# Patient Record
Sex: MALE | Race: WHITE | Employment: UNEMPLOYED | URBAN - METROPOLITAN AREA
[De-identification: names, ages, dates, MRNs, and addresses within clinical notes are randomized per-mention and may not be internally consistent; named-entity substitution may affect disease eponyms.]

---

## 2017-03-13 ENCOUNTER — HOSPITAL ENCOUNTER (OUTPATIENT)
Dept: LAB | Age: 57
Discharge: HOME | End: 2017-03-13

## 2017-03-13 LAB
ANION GAP BLD CALC-SCNC: 10 MMOL/L (ref 3–18)
BASOPHILS # BLD AUTO: 0.1 K/UL (ref 0–0.06)
BASOPHILS # BLD: 1 % (ref 0–2)
BUN SERPL-MCNC: 13 MG/DL (ref 7–18)
BUN/CREAT SERPL: 19 (ref 12–20)
CALCIUM SERPL-MCNC: 8.3 MG/DL (ref 8.5–10.1)
CHLORIDE SERPL-SCNC: 105 MMOL/L (ref 100–108)
CO2 SERPL-SCNC: 26 MMOL/L (ref 21–32)
CREAT SERPL-MCNC: 0.7 MG/DL (ref 0.6–1.3)
CRP SERPL-MCNC: <0.3 MG/DL (ref 0–0.3)
DIFFERENTIAL METHOD BLD: ABNORMAL
EOSINOPHIL # BLD: 0.3 K/UL (ref 0–0.4)
EOSINOPHIL NFR BLD: 4 % (ref 0–5)
ERYTHROCYTE [DISTWIDTH] IN BLOOD BY AUTOMATED COUNT: 14.3 % (ref 11.6–14.5)
ERYTHROCYTE [SEDIMENTATION RATE] IN BLOOD: 45 MM/HR (ref 0–20)
GLUCOSE SERPL-MCNC: 120 MG/DL (ref 74–99)
HCT VFR BLD AUTO: 35.5 % (ref 36–48)
HGB BLD-MCNC: 11.2 G/DL (ref 13–16)
LYMPHOCYTES # BLD AUTO: 19 % (ref 21–52)
LYMPHOCYTES # BLD: 1.4 K/UL (ref 0.9–3.6)
MCH RBC QN AUTO: 27.5 PG (ref 24–34)
MCHC RBC AUTO-ENTMCNC: 31.5 G/DL (ref 31–37)
MCV RBC AUTO: 87.2 FL (ref 74–97)
MONOCYTES # BLD: 0.8 K/UL (ref 0.05–1.2)
MONOCYTES NFR BLD AUTO: 10 % (ref 3–10)
NEUTS SEG # BLD: 4.8 K/UL (ref 1.8–8)
NEUTS SEG NFR BLD AUTO: 66 % (ref 40–73)
PLATELET # BLD AUTO: 268 K/UL (ref 135–420)
PMV BLD AUTO: 11 FL (ref 9.2–11.8)
POTASSIUM SERPL-SCNC: 4.4 MMOL/L (ref 3.5–5.5)
RBC # BLD AUTO: 4.07 M/UL (ref 4.7–5.5)
SODIUM SERPL-SCNC: 141 MMOL/L (ref 136–145)
WBC # BLD AUTO: 7.3 K/UL (ref 4.6–13.2)

## 2017-03-13 PROCEDURE — 86140 C-REACTIVE PROTEIN: CPT | Performed by: INTERNAL MEDICINE

## 2017-03-13 PROCEDURE — 80048 BASIC METABOLIC PNL TOTAL CA: CPT | Performed by: INTERNAL MEDICINE

## 2017-03-13 PROCEDURE — 85025 COMPLETE CBC W/AUTO DIFF WBC: CPT | Performed by: INTERNAL MEDICINE

## 2017-03-13 PROCEDURE — 85652 RBC SED RATE AUTOMATED: CPT | Performed by: INTERNAL MEDICINE

## 2017-03-18 ENCOUNTER — HOSPITAL ENCOUNTER (OUTPATIENT)
Dept: INFUSION THERAPY | Age: 57
Discharge: HOME | End: 2017-03-18
Payer: MEDICARE

## 2017-03-18 VITALS
HEART RATE: 88 BPM | BODY MASS INDEX: 26.87 KG/M2 | OXYGEN SATURATION: 99 % | DIASTOLIC BLOOD PRESSURE: 86 MMHG | WEIGHT: 177.3 LBS | RESPIRATION RATE: 18 BRPM | SYSTOLIC BLOOD PRESSURE: 130 MMHG | HEIGHT: 68 IN | TEMPERATURE: 98.1 F

## 2017-03-18 PROCEDURE — 74011250636 HC RX REV CODE- 250/636: Performed by: PHYSICIAN ASSISTANT

## 2017-03-18 PROCEDURE — 74011250636 HC RX REV CODE- 250/636: Performed by: INTERNAL MEDICINE

## 2017-03-18 PROCEDURE — 96365 THER/PROPH/DIAG IV INF INIT: CPT

## 2017-03-18 PROCEDURE — 74011000258 HC RX REV CODE- 258: Performed by: PHYSICIAN ASSISTANT

## 2017-03-18 PROCEDURE — 77030020847 HC STATLOK BARD -A

## 2017-03-18 RX ORDER — HEPARIN 100 UNIT/ML
500 SYRINGE INTRAVENOUS AS NEEDED
Status: DISCONTINUED | OUTPATIENT
Start: 2017-03-18 | End: 2017-03-22 | Stop reason: HOSPADM

## 2017-03-18 RX ORDER — SODIUM CHLORIDE 0.9 % (FLUSH) 0.9 %
10-40 SYRINGE (ML) INJECTION AS NEEDED
Status: DISCONTINUED | OUTPATIENT
Start: 2017-03-18 | End: 2017-03-22 | Stop reason: HOSPADM

## 2017-03-18 RX ORDER — HYDROMORPHONE HYDROCHLORIDE 4 MG/1
4 TABLET ORAL
COMMUNITY
End: 2017-04-06

## 2017-03-18 RX ORDER — CELECOXIB 100 MG/1
100 CAPSULE ORAL 2 TIMES DAILY
COMMUNITY

## 2017-03-18 RX ADMIN — CEFTRIAXONE SODIUM 2 G: 2 INJECTION, POWDER, FOR SOLUTION INTRAMUSCULAR; INTRAVENOUS at 11:20

## 2017-03-18 RX ADMIN — Medication 20 ML: at 11:55

## 2017-03-18 RX ADMIN — Medication 500 UNITS: at 11:56

## 2017-03-18 RX ADMIN — Medication 20 ML: at 11:20

## 2017-03-18 NOTE — PROGRESS NOTES
[facility] Progress Note    Date: 2017    Name: Denisse Wilcox    MRN: [id number]         : 1960    Mr. Kacy Orosco was assessed and education was provided. Patient's first visit ot [facility]. States that he was at [address] [place] [address] for rehab and was discharged yesterday. Staples noted along lumbar spine intact, skin slightly pink. No drainage noted, slight swelling at top staple. Mr. Jeff Ayon vitals were reviewed. Visit Vitals    /86 (BP 1 Location: Left arm, BP Patient Position: Sitting)    Pulse 88    Temp 98.1 °F (36.7 °C)    Resp 18    Ht 5' 8\" (1.727 m)    Wt 80.4 kg (177 lb 4.8 oz)    SpO2 99%    BMI 26.96 kg/m2     Good blood return obtained from each lumen of PICC line at right upper arm. Each lumen flushed with 10 ml NS. Dressing changed at PICC site. No irritation, redness, ecchymosis, or drainage noted. New Stat Uriel, Bio Patch and Tegaderm dressing applied. []  Vancomycin     []  Invanz     []  Cubicin     [x]  Rocephin 2 grams    was infused at 100 ml/hr. No s/s reaction noted. New Clave end applied with flush of 10 ml per lumen, followed by 2.5 ml of 100 units/ml Heparin per lumen. Lumens then wrapped and secured. Mr. Kacy Orosco tolerated infusion, and had no complaints at this time. Patient armband removed and shredded. Mr. Kacy Orosco was discharged from [place] [address] in stable condition at 1205. He is to return on 3/19/2017 at 1100 for his next appointment for abx.     Brynn Monte RN  2017  12:08 PM

## 2017-03-19 ENCOUNTER — HOSPITAL ENCOUNTER (OUTPATIENT)
Dept: INFUSION THERAPY | Age: 57
Discharge: HOME | End: 2017-03-19
Payer: MEDICARE

## 2017-03-19 VITALS
DIASTOLIC BLOOD PRESSURE: 87 MMHG | SYSTOLIC BLOOD PRESSURE: 126 MMHG | TEMPERATURE: 98.8 F | RESPIRATION RATE: 18 BRPM | HEART RATE: 92 BPM

## 2017-03-19 PROCEDURE — 74011000258 HC RX REV CODE- 258: Performed by: PHYSICIAN ASSISTANT

## 2017-03-19 PROCEDURE — 74011250636 HC RX REV CODE- 250/636: Performed by: PHYSICIAN ASSISTANT

## 2017-03-19 PROCEDURE — 96365 THER/PROPH/DIAG IV INF INIT: CPT

## 2017-03-19 RX ORDER — HEPARIN 100 UNIT/ML
500 SYRINGE INTRAVENOUS AS NEEDED
Status: DISCONTINUED | OUTPATIENT
Start: 2017-03-19 | End: 2017-03-23 | Stop reason: HOSPADM

## 2017-03-19 RX ORDER — SODIUM CHLORIDE 0.9 % (FLUSH) 0.9 %
10-40 SYRINGE (ML) INJECTION AS NEEDED
Status: DISCONTINUED | OUTPATIENT
Start: 2017-03-19 | End: 2017-03-23 | Stop reason: HOSPADM

## 2017-03-19 RX ADMIN — Medication 40 ML: at 12:15

## 2017-03-19 RX ADMIN — Medication 500 UNITS: at 12:15

## 2017-03-19 RX ADMIN — Medication 40 ML: at 11:30

## 2017-03-19 RX ADMIN — CEFTRIAXONE SODIUM 2 G: 2 INJECTION, POWDER, FOR SOLUTION INTRAMUSCULAR; INTRAVENOUS at 11:34

## 2017-03-20 ENCOUNTER — HOSPITAL ENCOUNTER (OUTPATIENT)
Dept: INFUSION THERAPY | Age: 57
Discharge: HOME | End: 2017-03-20
Payer: MEDICARE

## 2017-03-20 VITALS
OXYGEN SATURATION: 99 % | HEART RATE: 93 BPM | DIASTOLIC BLOOD PRESSURE: 82 MMHG | RESPIRATION RATE: 18 BRPM | TEMPERATURE: 97.8 F | SYSTOLIC BLOOD PRESSURE: 143 MMHG

## 2017-03-20 LAB
ANION GAP BLD CALC-SCNC: 13 MMOL/L (ref 3–18)
BASOPHILS # BLD AUTO: 0.1 K/UL (ref 0–0.06)
BASOPHILS # BLD: 1 % (ref 0–2)
BUN SERPL-MCNC: 21 MG/DL (ref 7–18)
BUN/CREAT SERPL: 31 (ref 12–20)
CALCIUM SERPL-MCNC: 8.8 MG/DL (ref 8.5–10.1)
CHLORIDE SERPL-SCNC: 106 MMOL/L (ref 100–108)
CO2 SERPL-SCNC: 25 MMOL/L (ref 21–32)
CREAT SERPL-MCNC: 0.68 MG/DL (ref 0.6–1.3)
CRP SERPL-MCNC: 2.9 MG/DL (ref 0–0.3)
DIFFERENTIAL METHOD BLD: ABNORMAL
EOSINOPHIL # BLD: 0.3 K/UL (ref 0–0.4)
EOSINOPHIL NFR BLD: 4 % (ref 0–5)
ERYTHROCYTE [DISTWIDTH] IN BLOOD BY AUTOMATED COUNT: 13.8 % (ref 11.6–14.5)
ERYTHROCYTE [SEDIMENTATION RATE] IN BLOOD: 18 MM/HR (ref 0–20)
GLUCOSE SERPL-MCNC: 113 MG/DL (ref 74–99)
HCT VFR BLD AUTO: 39.6 % (ref 36–48)
HGB BLD-MCNC: 12.4 G/DL (ref 13–16)
LYMPHOCYTES # BLD AUTO: 23 % (ref 21–52)
LYMPHOCYTES # BLD: 2 K/UL (ref 0.9–3.6)
MCH RBC QN AUTO: 27.7 PG (ref 24–34)
MCHC RBC AUTO-ENTMCNC: 31.3 G/DL (ref 31–37)
MCV RBC AUTO: 88.6 FL (ref 74–97)
MONOCYTES # BLD: 0.9 K/UL (ref 0.05–1.2)
MONOCYTES NFR BLD AUTO: 10 % (ref 3–10)
NEUTS SEG # BLD: 5.5 K/UL (ref 1.8–8)
NEUTS SEG NFR BLD AUTO: 62 % (ref 40–73)
PLATELET # BLD AUTO: 245 K/UL (ref 135–420)
PMV BLD AUTO: 11.1 FL (ref 9.2–11.8)
POTASSIUM SERPL-SCNC: 4 MMOL/L (ref 3.5–5.5)
RBC # BLD AUTO: 4.47 M/UL (ref 4.7–5.5)
SODIUM SERPL-SCNC: 144 MMOL/L (ref 136–145)
WBC # BLD AUTO: 8.8 K/UL (ref 4.6–13.2)

## 2017-03-20 PROCEDURE — 85652 RBC SED RATE AUTOMATED: CPT | Performed by: INTERNAL MEDICINE

## 2017-03-20 PROCEDURE — 86140 C-REACTIVE PROTEIN: CPT | Performed by: INTERNAL MEDICINE

## 2017-03-20 PROCEDURE — 74011250636 HC RX REV CODE- 250/636: Performed by: PHYSICIAN ASSISTANT

## 2017-03-20 PROCEDURE — 96365 THER/PROPH/DIAG IV INF INIT: CPT

## 2017-03-20 PROCEDURE — 74011000258 HC RX REV CODE- 258: Performed by: PHYSICIAN ASSISTANT

## 2017-03-20 PROCEDURE — 74011250636 HC RX REV CODE- 250/636: Performed by: INTERNAL MEDICINE

## 2017-03-20 PROCEDURE — 85025 COMPLETE CBC W/AUTO DIFF WBC: CPT | Performed by: INTERNAL MEDICINE

## 2017-03-20 PROCEDURE — 80048 BASIC METABOLIC PNL TOTAL CA: CPT | Performed by: INTERNAL MEDICINE

## 2017-03-20 RX ORDER — HEPARIN 100 UNIT/ML
500 SYRINGE INTRAVENOUS AS NEEDED
Status: DISPENSED | OUTPATIENT
Start: 2017-03-20 | End: 2017-03-21

## 2017-03-20 RX ORDER — SODIUM CHLORIDE 0.9 % (FLUSH) 0.9 %
10 SYRINGE (ML) INJECTION AS NEEDED
Status: DISCONTINUED | OUTPATIENT
Start: 2017-03-20 | End: 2017-03-24 | Stop reason: HOSPADM

## 2017-03-20 RX ADMIN — HEPARIN 500 UNITS: 100 SYRINGE at 15:52

## 2017-03-20 RX ADMIN — CEFTRIAXONE SODIUM 2 G: 2 INJECTION, POWDER, FOR SOLUTION INTRAMUSCULAR; INTRAVENOUS at 15:11

## 2017-03-20 RX ADMIN — Medication 10 ML: at 15:11

## 2017-03-20 RX ADMIN — Medication 10 ML: at 15:42

## 2017-03-20 NOTE — PROGRESS NOTES
[facility] Progress Note    Date: 2017    Name: Dejah Blood    MRN: [id number]         : 1960      Mr. Gertrudis Quinonez arrived to [facility] at [phone]. Mr. Gertrudis Quinonez was assessed and education was provided. Mr. Yohana Mercer vitals were reviewed. Visit Vitals    /82 (BP 1 Location: Left arm, BP Patient Position: Sitting)    Pulse 93    Temp 97.8 °F (36.6 °C)    Resp 18    SpO2 99%       Pt with Right upper arm double lumen PICC line. Each lumen flushes without difficulty and positive for blood return. Dressing CDI. No redness, bruising, or swelling noted at site and/ or extremity. Pt denies tenderness. Blood drawn for labs. Labs obtained and reviewed. Recent Results (from the past 12 hour(s))   CBC WITH AUTOMATED DIFF    Collection Time: 17  3:00 PM   Result Value Ref Range    WBC 8.8 4.6 - 13.2 K/uL    RBC 4.47 (L) 4.70 - 5.50 M/uL    HGB 12.4 (L) 13.0 - 16.0 g/dL    HCT 39.6 36.0 - 48.0 %    MCV 88.6 74.0 - 97.0 FL    MCH 27.7 24.0 - 34.0 PG    MCHC 31.3 31.0 - 37.0 g/dL    RDW 13.8 11.6 - 14.5 %    PLATELET 661 053 - 777 K/uL    MPV 11.1 9.2 - 11.8 FL    NEUTROPHILS 62 40 - 73 %    LYMPHOCYTES 23 21 - 52 %    MONOCYTES 10 3 - 10 %    EOSINOPHILS 4 0 - 5 %    BASOPHILS 1 0 - 2 %    ABS. NEUTROPHILS 5.5 1.8 - 8.0 K/UL    ABS. LYMPHOCYTES 2.0 0.9 - 3.6 K/UL    ABS. MONOCYTES 0.9 0.05 - 1.2 K/UL    ABS. EOSINOPHILS 0.3 0.0 - 0.4 K/UL    ABS.  BASOPHILS 0.1 (H) 0.0 - 0.06 K/UL    DF AUTOMATED     METABOLIC PANEL, BASIC    Collection Time: 17  3:00 PM   Result Value Ref Range    Sodium 144 136 - 145 mmol/L    Potassium 4.0 3.5 - 5.5 mmol/L    Chloride 106 100 - 108 mmol/L    CO2 25 21 - 32 mmol/L    Anion gap 13 3.0 - 18 mmol/L    Glucose 113 (H) 74 - 99 mg/dL    BUN 21 (H) 7.0 - 18 MG/DL    Creatinine 0.68 0.6 - 1.3 MG/DL    BUN/Creatinine ratio 31 (H) 12 - 20      GFR est AA >60 >60 ml/min/1.73m2    GFR est non-AA >60 >60 ml/min/1.73m2    Calcium 8.8 8.5 - 10.1 MG/DL   SED RATE (ESR)    Collection Time: 03/20/17  3:00 PM   Result Value Ref Range    Sed rate, automated 18 0 - 20 mm/hr       Rocephin 2 g was administered as ordered via purple  lumen followed by normal saline flush. Mr. Natalie Brownlee tolerated well without complaints. Flushed each lumen of pt's PICC line with heparin per order. Lumens wrapped in gauze and paper tape. Mr. Natalie Brownlee was discharged from [place] [address] in stable condition at [phone]. He is to return on March 21, 2017 at 1300 for his next appointment.     Sole Alvarez RN  March 20, 2017

## 2017-03-21 ENCOUNTER — HOSPITAL ENCOUNTER (OUTPATIENT)
Dept: INFUSION THERAPY | Age: 57
Discharge: HOME | End: 2017-03-21
Payer: MEDICARE

## 2017-03-21 VITALS
SYSTOLIC BLOOD PRESSURE: 147 MMHG | DIASTOLIC BLOOD PRESSURE: 88 MMHG | OXYGEN SATURATION: 98 % | RESPIRATION RATE: 18 BRPM | TEMPERATURE: 98.4 F | HEART RATE: 86 BPM

## 2017-03-21 PROCEDURE — 74011250636 HC RX REV CODE- 250/636: Performed by: INTERNAL MEDICINE

## 2017-03-21 PROCEDURE — 96365 THER/PROPH/DIAG IV INF INIT: CPT

## 2017-03-21 PROCEDURE — 74011250636 HC RX REV CODE- 250/636: Performed by: PHYSICIAN ASSISTANT

## 2017-03-21 PROCEDURE — 74011000258 HC RX REV CODE- 258: Performed by: PHYSICIAN ASSISTANT

## 2017-03-21 RX ORDER — HEPARIN 100 UNIT/ML
500 SYRINGE INTRAVENOUS ONCE
Status: COMPLETED | OUTPATIENT
Start: 2017-03-21 | End: 2017-03-21

## 2017-03-21 RX ORDER — SODIUM CHLORIDE 0.9 % (FLUSH) 0.9 %
10-40 SYRINGE (ML) INJECTION AS NEEDED
Status: DISCONTINUED | OUTPATIENT
Start: 2017-03-21 | End: 2017-03-25 | Stop reason: HOSPADM

## 2017-03-21 RX ADMIN — HEPARIN 500 UNITS: 100 SYRINGE at 14:10

## 2017-03-21 RX ADMIN — CEFTRIAXONE SODIUM 2 G: 2 INJECTION, POWDER, FOR SOLUTION INTRAMUSCULAR; INTRAVENOUS at 13:34

## 2017-03-21 RX ADMIN — Medication 10 ML: at 14:10

## 2017-03-21 RX ADMIN — Medication 20 ML: at 13:33

## 2017-03-21 NOTE — PROGRESS NOTES
[facility] Progress Note    Date: 2017    Name: Niru Weiner    MRN: [id number]         : 1960    Rocephin Infusion    Mr. Antonio Pink to [facility], ambulatory using a cane at 1320. Pt was assessed and education was provided. Pt's feet are swollen with pitting edema: right with 1-2+ foot/ankle and left with 2-3+ foot/ankle/calf. Pt endorses left calf pain. Both feet with 2+ DP and PT pulses and normal temperature. Toes on both feet are pink. Pt denies h/o DVT. Pt reports his feet were swollen when in the hospital and rehab because he was \"mostly lying on his back. \" He states the swelling is worse today but that he thinks that is because he has \"been on his feet. \" He reports he is going to see his neurosurgeon on Thursday and will discuss the swelling with him at that time. Pt instructed to keep his feet elevated as much as possible and to call 911 for SOB, cough with pink or frothy sputum, or chest pain. Instructed him to call MD right away if pain or swelling get worse. Mr. Carol Ann Canada vitals were reviewed. Visit Vitals    /88 (BP 1 Location: Left arm, BP Patient Position: Sitting)    Pulse 86    Temp 98.4 °F (36.9 °C)    Resp 18    SpO2 98%       Right upper arm PICC flushed easily and had brisk blood return via red port and blood return from purple lumen with vigorous flushing. PICC dressing c/d/i and not due to be changed today. No swelling, redness, streaking, warmth or drainage noted in arm. Pt denied c/o pain in arm.      []  Vancomycin     []  Invanz     []  Cubicin     [x]  Rocephin 2 gm    was infused at 100 ml/hr (over approximately 30 minutes). Mr. Antonio Pink tolerated infusion, and had no complaints at this time. Both lumens of PICC flushed with NS 10 ml and Heparin 250 units. Lumens wrapped with guaze and paper tape. Stockinette placed over dressing for protection. Patient armband removed and shredded.     Mr. Antonio Pink was discharged from [place] [address] in stable condition at 1415. He is to return on 3/22/17 at 1500 for his next antibiotic appointment.     Tootie Gambino RN  March 21, 2017

## 2017-03-22 ENCOUNTER — HOSPITAL ENCOUNTER (OUTPATIENT)
Dept: INFUSION THERAPY | Age: 57
Discharge: HOME | End: 2017-03-22
Payer: MEDICARE

## 2017-03-22 VITALS
OXYGEN SATURATION: 98 % | SYSTOLIC BLOOD PRESSURE: 142 MMHG | HEART RATE: 93 BPM | RESPIRATION RATE: 18 BRPM | TEMPERATURE: 97.7 F | DIASTOLIC BLOOD PRESSURE: 78 MMHG

## 2017-03-22 PROCEDURE — 74011250636 HC RX REV CODE- 250/636: Performed by: PHYSICIAN ASSISTANT

## 2017-03-22 PROCEDURE — 74011250636 HC RX REV CODE- 250/636: Performed by: INTERNAL MEDICINE

## 2017-03-22 PROCEDURE — 74011000258 HC RX REV CODE- 258: Performed by: PHYSICIAN ASSISTANT

## 2017-03-22 PROCEDURE — 96365 THER/PROPH/DIAG IV INF INIT: CPT

## 2017-03-22 RX ORDER — HEPARIN 100 UNIT/ML
500 SYRINGE INTRAVENOUS ONCE
Status: COMPLETED | OUTPATIENT
Start: 2017-03-22 | End: 2017-03-22

## 2017-03-22 RX ORDER — SODIUM CHLORIDE 0.9 % (FLUSH) 0.9 %
10-40 SYRINGE (ML) INJECTION AS NEEDED
Status: DISCONTINUED | OUTPATIENT
Start: 2017-03-22 | End: 2017-03-26 | Stop reason: HOSPADM

## 2017-03-22 RX ADMIN — HEPARIN 500 UNITS: 100 SYRINGE at 15:56

## 2017-03-22 RX ADMIN — CEFTRIAXONE SODIUM 2 G: 2 INJECTION, POWDER, FOR SOLUTION INTRAMUSCULAR; INTRAVENOUS at 15:16

## 2017-03-22 RX ADMIN — Medication 10 ML: at 15:55

## 2017-03-22 NOTE — PROGRESS NOTES
[facility] Progress Note    Date: 2017    Name: Yo Perez    MRN: [id number]         : 1960    Rocephin Infusion    Mr. Alonzo Navarrete to [facility], ambulatory at 1510. Pt was assessed and education was provided. Mr. Parul Iniguez vitals were reviewed. Visit Vitals    /78 (BP 1 Location: Left arm, BP Patient Position: Sitting)    Pulse 93    Temp 97.7 °F (36.5 °C)    Resp 18    SpO2 98%       Right upper arm PICC flushed easily and had brisk blood return via both ports. PICC dressing c/d/i and not due to be changed. No swelling, redness, streaking, warmth or drainage noted in arm. Pt denied c/o pain in arm.      []  Vancomycin     []  Invanz     []  Cubicin     [x]  Rocephin 2 grams    was infused at 100 ml/hr (over approximately 30 minutes). Mr. Alonzo Navarrete tolerated infusion, and had no complaints at this time. Both lumens of PICC flushed with NS 10 ml and Heparin 250 units. Lumens wrapped with guaze and paper tape. Stockinette placed over dressing for protection. Patient armband removed and shredded. Mr. Alonzo Navarrete was discharged from [place] [address] in stable condition a 1600t . He is to return on 3/23/17 at 1300 for his next antibiotic appointment.     Shaun Francisco RN  2017

## 2017-03-23 ENCOUNTER — HOSPITAL ENCOUNTER (OUTPATIENT)
Dept: INFUSION THERAPY | Age: 57
Discharge: HOME | End: 2017-03-23
Payer: MEDICARE

## 2017-03-23 VITALS
RESPIRATION RATE: 18 BRPM | TEMPERATURE: 98 F | DIASTOLIC BLOOD PRESSURE: 92 MMHG | SYSTOLIC BLOOD PRESSURE: 156 MMHG | OXYGEN SATURATION: 97 % | HEART RATE: 77 BPM

## 2017-03-23 PROCEDURE — 74011000258 HC RX REV CODE- 258: Performed by: PHYSICIAN ASSISTANT

## 2017-03-23 PROCEDURE — 96365 THER/PROPH/DIAG IV INF INIT: CPT

## 2017-03-23 PROCEDURE — 74011250636 HC RX REV CODE- 250/636: Performed by: PHYSICIAN ASSISTANT

## 2017-03-23 PROCEDURE — 74011250636 HC RX REV CODE- 250/636: Performed by: INTERNAL MEDICINE

## 2017-03-23 RX ORDER — SODIUM CHLORIDE 0.9 % (FLUSH) 0.9 %
5-10 SYRINGE (ML) INJECTION AS NEEDED
Status: DISCONTINUED | OUTPATIENT
Start: 2017-03-23 | End: 2017-03-27 | Stop reason: HOSPADM

## 2017-03-23 RX ORDER — HEPARIN 100 UNIT/ML
500 SYRINGE INTRAVENOUS ONCE
Status: COMPLETED | OUTPATIENT
Start: 2017-03-23 | End: 2017-03-23

## 2017-03-23 RX ADMIN — Medication 500 UNITS: at 13:40

## 2017-03-23 RX ADMIN — Medication 10 ML: at 13:40

## 2017-03-23 RX ADMIN — CEFTRIAXONE SODIUM 2 G: 2 INJECTION, POWDER, FOR SOLUTION INTRAMUSCULAR; INTRAVENOUS at 13:08

## 2017-03-23 NOTE — PROGRESS NOTES
[facility] Progress Note    Date: 2017    Name: Sadie Nielsen    MRN: [id number]         : 1960    Mr. Bunny Duverney was assessed and education was provided. Mr. Trung Johnson vitals were reviewed. Visit Vitals    BP (!) 156/92 (BP 1 Location: Left arm, BP Patient Position: Sitting)    Pulse 77    Temp 98 °F (36.7 °C)    Resp 18    SpO2 97%       Lab results were obtained and reviewed. No results found for this or any previous visit (from the past 12 hour(s)). []  Vancomycin     []  Invanz     []  Cubicin     [x]  Rocephin 2 grams IV    was infused at  100 ml/hr. Mr. Bunny Duverney tolerated infusion, and had no complaints at this time. Patient armband removed and shredded. Mr. Bunny Duverney was discharged from [place] [address] in stable condition at 1350. He is to return on 3/24/17 for his next appointment.     Fanta Adan RN  2017  1:20 PM

## 2017-03-24 ENCOUNTER — HOSPITAL ENCOUNTER (OUTPATIENT)
Dept: INFUSION THERAPY | Age: 57
Discharge: HOME | End: 2017-03-24
Payer: MEDICARE

## 2017-03-24 VITALS
OXYGEN SATURATION: 98 % | SYSTOLIC BLOOD PRESSURE: 143 MMHG | TEMPERATURE: 98.6 F | HEART RATE: 82 BPM | RESPIRATION RATE: 18 BRPM | DIASTOLIC BLOOD PRESSURE: 83 MMHG

## 2017-03-24 PROCEDURE — 96365 THER/PROPH/DIAG IV INF INIT: CPT

## 2017-03-24 PROCEDURE — 74011250636 HC RX REV CODE- 250/636: Performed by: PHYSICIAN ASSISTANT

## 2017-03-24 PROCEDURE — 74011000258 HC RX REV CODE- 258: Performed by: PHYSICIAN ASSISTANT

## 2017-03-24 PROCEDURE — 74011250636 HC RX REV CODE- 250/636: Performed by: INTERNAL MEDICINE

## 2017-03-24 RX ORDER — SODIUM CHLORIDE 0.9 % (FLUSH) 0.9 %
10-40 SYRINGE (ML) INJECTION AS NEEDED
Status: DISCONTINUED | OUTPATIENT
Start: 2017-03-24 | End: 2017-03-28 | Stop reason: HOSPADM

## 2017-03-24 RX ORDER — HEPARIN 100 UNIT/ML
500 SYRINGE INTRAVENOUS AS NEEDED
Status: DISCONTINUED | OUTPATIENT
Start: 2017-03-24 | End: 2017-03-28 | Stop reason: HOSPADM

## 2017-03-24 RX ADMIN — Medication 20 ML: at 14:29

## 2017-03-24 RX ADMIN — Medication 500 UNITS: at 15:00

## 2017-03-24 RX ADMIN — CEFTRIAXONE SODIUM 2 G: 2 INJECTION, POWDER, FOR SOLUTION INTRAMUSCULAR; INTRAVENOUS at 14:29

## 2017-03-24 RX ADMIN — Medication 10 ML: at 14:59

## 2017-03-25 ENCOUNTER — HOSPITAL ENCOUNTER (OUTPATIENT)
Dept: INFUSION THERAPY | Age: 57
Discharge: HOME | End: 2017-03-25
Payer: MEDICARE

## 2017-03-25 VITALS
RESPIRATION RATE: 18 BRPM | HEART RATE: 79 BPM | SYSTOLIC BLOOD PRESSURE: 150 MMHG | DIASTOLIC BLOOD PRESSURE: 87 MMHG | OXYGEN SATURATION: 99 % | TEMPERATURE: 98.1 F

## 2017-03-25 PROCEDURE — 74011000258 HC RX REV CODE- 258: Performed by: PHYSICIAN ASSISTANT

## 2017-03-25 PROCEDURE — 74011250636 HC RX REV CODE- 250/636: Performed by: PHYSICIAN ASSISTANT

## 2017-03-25 PROCEDURE — 96365 THER/PROPH/DIAG IV INF INIT: CPT

## 2017-03-25 PROCEDURE — 77030020847 HC STATLOK BARD -A

## 2017-03-25 PROCEDURE — 74011250636 HC RX REV CODE- 250/636: Performed by: INTERNAL MEDICINE

## 2017-03-25 RX ORDER — HEPARIN 100 UNIT/ML
500 SYRINGE INTRAVENOUS ONCE
Status: COMPLETED | OUTPATIENT
Start: 2017-03-25 | End: 2017-03-25

## 2017-03-25 RX ORDER — SODIUM CHLORIDE 0.9 % (FLUSH) 0.9 %
10-40 SYRINGE (ML) INJECTION AS NEEDED
Status: DISCONTINUED | OUTPATIENT
Start: 2017-03-25 | End: 2017-03-29 | Stop reason: HOSPADM

## 2017-03-25 RX ADMIN — Medication 20 ML: at 10:21

## 2017-03-25 RX ADMIN — HEPARIN 500 UNITS: 100 SYRINGE at 11:08

## 2017-03-25 RX ADMIN — Medication 20 ML: at 11:07

## 2017-03-25 RX ADMIN — CEFTRIAXONE SODIUM 2 G: 2 INJECTION, POWDER, FOR SOLUTION INTRAMUSCULAR; INTRAVENOUS at 10:35

## 2017-03-25 NOTE — PROGRESS NOTES
[facility] Progress Note    Date: 2017    Name: Bere Saldivar    MRN: [id number]         : 1960      Mr. Cee Hightower arrived to [facility] at [address] Patient reports feeling nausea this AM but did not vomit. Patient states he doesn't know why he feels nauseous. Patient offered some ginger ale and ice. Moments later reports feeling much better. Mr. Cee Hightower was assessed and education was provided. Mr. Lizett Ramsey vitals were reviewed. Visit Vitals    /87 (BP 1 Location: Left arm, BP Patient Position: Sitting)    Pulse 79    Temp 98.1 °F (36.7 °C)    Resp 18    SpO2 99%       Pt with right upper arm double lumen PICC line dressing change done today. Stat nile and end caps changed. Each lumen flushes without difficulty and positive for blood return. No redness, bruising, or swelling noted at site and/ or extremity. Pt denies tenderness. Ceftriaxone 2gm was administered as ordered via purple lumen followed by normal saline flush. Mr. Cee Hightower tolerated well without complaints. Flushed each lumen of pt's PICC line with heparin per order. Wrapped lumens with gauze & paper tape. Mr. Cee Hightower was discharged from [place] [address] in stable condition at 1110. He is to return on 3/26/17 at 1000 for his next appointment.     Brie Mclaughlin  2017

## 2017-03-26 ENCOUNTER — HOSPITAL ENCOUNTER (OUTPATIENT)
Dept: INFUSION THERAPY | Age: 57
Discharge: HOME | End: 2017-03-26
Payer: MEDICARE

## 2017-03-26 VITALS
SYSTOLIC BLOOD PRESSURE: 141 MMHG | DIASTOLIC BLOOD PRESSURE: 77 MMHG | HEART RATE: 77 BPM | OXYGEN SATURATION: 95 % | RESPIRATION RATE: 18 BRPM | TEMPERATURE: 97.9 F

## 2017-03-26 PROCEDURE — 74011250636 HC RX REV CODE- 250/636: Performed by: PHYSICIAN ASSISTANT

## 2017-03-26 PROCEDURE — 74011000258 HC RX REV CODE- 258: Performed by: PHYSICIAN ASSISTANT

## 2017-03-26 PROCEDURE — 96365 THER/PROPH/DIAG IV INF INIT: CPT

## 2017-03-26 RX ORDER — SODIUM CHLORIDE 0.9 % (FLUSH) 0.9 %
10 SYRINGE (ML) INJECTION AS NEEDED
Status: DISCONTINUED | OUTPATIENT
Start: 2017-03-26 | End: 2017-03-30 | Stop reason: HOSPADM

## 2017-03-26 RX ORDER — HEPARIN 100 UNIT/ML
500 SYRINGE INTRAVENOUS ONCE
Status: COMPLETED | OUTPATIENT
Start: 2017-03-26 | End: 2017-03-26

## 2017-03-26 RX ADMIN — Medication 10 ML: at 10:44

## 2017-03-26 RX ADMIN — Medication 10 ML: at 10:05

## 2017-03-26 RX ADMIN — HEPARIN 500 UNITS: 100 SYRINGE at 10:44

## 2017-03-26 RX ADMIN — CEFTRIAXONE SODIUM 2 G: 2 INJECTION, POWDER, FOR SOLUTION INTRAMUSCULAR; INTRAVENOUS at 10:07

## 2017-03-26 NOTE — PROGRESS NOTES
[facility] Progress Note    Date: 2017    Name: Sadie Nielsen    MRN: [id number]         : 1960      Mr. Bunny Duverney arrived to [facility] at 1000    Mr. Bunny Duverney was assessed and education was provided. Mr. Trung Johnson vitals were reviewed. Visit Vitals    /77 (BP 1 Location: Left arm, BP Patient Position: Sitting)    Pulse 77    Temp 97.9 °F (36.6 °C)    Resp 18    SpO2 95%       Pt with right upper arm double lumen PICC line. Each lumen flushes without difficulty and positive for blood return. Dressing CDI. No redness, bruising, or swelling noted at site and/ or extremity. Pt denies tenderness. Ceftriaxone 2gm in 50 cc normal saline was administered as ordered over 30 minutes via purple lumen followed by 10 cc normal saline flush and instilled 2.5 cc Heparin 100 units, in bilateral lumens    Mr. Bunny Duverney tolerated well without complaints. Wrapped lumens with 4x4, paper tape and secured with a stockinette. Mr. Bunny Duverney was discharged from [place] [address] in stable condition at 1045. He is to return on 3/27/17 at 36 for his next appointment for antibiotics and labs.     Caterina Barry RN  2017

## 2017-03-27 ENCOUNTER — HOSPITAL ENCOUNTER (OUTPATIENT)
Dept: INFUSION THERAPY | Age: 57
Discharge: HOME | End: 2017-03-27
Payer: MEDICARE

## 2017-03-27 VITALS
TEMPERATURE: 97.8 F | SYSTOLIC BLOOD PRESSURE: 158 MMHG | DIASTOLIC BLOOD PRESSURE: 104 MMHG | OXYGEN SATURATION: 97 % | RESPIRATION RATE: 18 BRPM | HEART RATE: 82 BPM

## 2017-03-27 LAB
ANION GAP BLD CALC-SCNC: 10 MMOL/L (ref 3–18)
BASOPHILS # BLD AUTO: 0.1 K/UL (ref 0–0.06)
BASOPHILS # BLD: 1 % (ref 0–2)
BUN SERPL-MCNC: 17 MG/DL (ref 7–18)
BUN/CREAT SERPL: 23 (ref 12–20)
CALCIUM SERPL-MCNC: 8.5 MG/DL (ref 8.5–10.1)
CHLORIDE SERPL-SCNC: 108 MMOL/L (ref 100–108)
CO2 SERPL-SCNC: 26 MMOL/L (ref 21–32)
CREAT SERPL-MCNC: 0.74 MG/DL (ref 0.6–1.3)
CRP SERPL-MCNC: 0.6 MG/DL (ref 0–0.3)
DIFFERENTIAL METHOD BLD: ABNORMAL
EOSINOPHIL # BLD: 0.2 K/UL (ref 0–0.4)
EOSINOPHIL NFR BLD: 3 % (ref 0–5)
ERYTHROCYTE [DISTWIDTH] IN BLOOD BY AUTOMATED COUNT: 14.4 % (ref 11.6–14.5)
ERYTHROCYTE [SEDIMENTATION RATE] IN BLOOD: 11 MM/HR (ref 0–20)
GLUCOSE SERPL-MCNC: 122 MG/DL (ref 74–99)
HCT VFR BLD AUTO: 40.3 % (ref 36–48)
HGB BLD-MCNC: 12.5 G/DL (ref 13–16)
LYMPHOCYTES # BLD AUTO: 23 % (ref 21–52)
LYMPHOCYTES # BLD: 1.3 K/UL (ref 0.9–3.6)
MCH RBC QN AUTO: 27.4 PG (ref 24–34)
MCHC RBC AUTO-ENTMCNC: 31 G/DL (ref 31–37)
MCV RBC AUTO: 88.4 FL (ref 74–97)
MONOCYTES # BLD: 0.5 K/UL (ref 0.05–1.2)
MONOCYTES NFR BLD AUTO: 9 % (ref 3–10)
NEUTS SEG # BLD: 3.6 K/UL (ref 1.8–8)
NEUTS SEG NFR BLD AUTO: 64 % (ref 40–73)
PLATELET # BLD AUTO: 184 K/UL (ref 135–420)
PMV BLD AUTO: 10.8 FL (ref 9.2–11.8)
POTASSIUM SERPL-SCNC: 4 MMOL/L (ref 3.5–5.5)
RBC # BLD AUTO: 4.56 M/UL (ref 4.7–5.5)
SODIUM SERPL-SCNC: 144 MMOL/L (ref 136–145)
WBC # BLD AUTO: 5.6 K/UL (ref 4.6–13.2)

## 2017-03-27 PROCEDURE — 80048 BASIC METABOLIC PNL TOTAL CA: CPT

## 2017-03-27 PROCEDURE — 85025 COMPLETE CBC W/AUTO DIFF WBC: CPT

## 2017-03-27 PROCEDURE — 85652 RBC SED RATE AUTOMATED: CPT

## 2017-03-27 PROCEDURE — 74011250636 HC RX REV CODE- 250/636

## 2017-03-27 PROCEDURE — 74011250636 HC RX REV CODE- 250/636: Performed by: PHYSICIAN ASSISTANT

## 2017-03-27 PROCEDURE — 74011000258 HC RX REV CODE- 258: Performed by: PHYSICIAN ASSISTANT

## 2017-03-27 PROCEDURE — 96365 THER/PROPH/DIAG IV INF INIT: CPT

## 2017-03-27 PROCEDURE — 86140 C-REACTIVE PROTEIN: CPT

## 2017-03-27 RX ORDER — HEPARIN 100 UNIT/ML
500 SYRINGE INTRAVENOUS AS NEEDED
Status: DISCONTINUED | OUTPATIENT
Start: 2017-03-27 | End: 2017-03-31 | Stop reason: HOSPADM

## 2017-03-27 RX ORDER — SODIUM CHLORIDE 0.9 % (FLUSH) 0.9 %
10-40 SYRINGE (ML) INJECTION AS NEEDED
Status: DISCONTINUED | OUTPATIENT
Start: 2017-03-27 | End: 2017-03-31 | Stop reason: HOSPADM

## 2017-03-27 RX ADMIN — Medication 20 ML: at 12:02

## 2017-03-27 RX ADMIN — CEFTRIAXONE SODIUM 2 G: 2 INJECTION, POWDER, FOR SOLUTION INTRAMUSCULAR; INTRAVENOUS at 11:55

## 2017-03-27 RX ADMIN — Medication 20 ML: at 12:33

## 2017-03-27 RX ADMIN — Medication 500 UNITS: at 12:34

## 2017-03-27 NOTE — PROGRESS NOTES
\[facility]\"" Progress Note    Date: 2017    Name: Lambert Moore    MRN: [id number]         : 1960    Mr. Lynn Reddy was assessed and education was provided. Patient is concerned because his left leg remains swollen, but is being treated for blood clot. Both feet are cool to touch, pulses are palpable. Mr. Andrea Payan vitals were reviewed. Visit Vitals    BP (!) 158/104 (BP 1 Location: Left arm, BP Patient Position: Sitting)    Pulse 82    Temp 97.8 °F (36.6 °C)    Resp 18    SpO2 97%      Good blood return obtained from each lumen of PICC line at right upper arm. Labs drawn from red lumen then flushed with 20 ml NS. Lab results were obtained and reviewed. Recent Results (from the past 12 hour(s))   METABOLIC PANEL, BASIC    Collection Time: 17 11:45 AM   Result Value Ref Range    Sodium 144 136 - 145 mmol/L    Potassium 4.0 3.5 - 5.5 mmol/L    Chloride 108 100 - 108 mmol/L    CO2 26 21 - 32 mmol/L    Anion gap 10 3.0 - 18 mmol/L    Glucose 122 (H) 74 - 99 mg/dL    BUN 17 7.0 - 18 MG/DL    Creatinine 0.74 0.6 - 1.3 MG/DL    BUN/Creatinine ratio 23 (H) 12 - 20      GFR est AA >60 >60 ml/min/1.73m2    GFR est non-AA >60 >60 ml/min/1.73m2    Calcium 8.5 8.5 - 10.1 MG/DL   CBC WITH AUTOMATED DIFF    Collection Time: 17 11:45 AM   Result Value Ref Range    WBC 5.6 4.6 - 13.2 K/uL    RBC 4.56 (L) 4.70 - 5.50 M/uL    HGB 12.5 (L) 13.0 - 16.0 g/dL    HCT 40.3 36.0 - 48.0 %    MCV 88.4 74.0 - 97.0 FL    MCH 27.4 24.0 - 34.0 PG    MCHC 31.0 31.0 - 37.0 g/dL    RDW 14.4 11.6 - 14.5 %    PLATELET 831 060 - 230 K/uL    MPV 10.8 9.2 - 11.8 FL    NEUTROPHILS 64 40 - 73 %    LYMPHOCYTES 23 21 - 52 %    MONOCYTES 9 3 - 10 %    EOSINOPHILS 3 0 - 5 %    BASOPHILS 1 0 - 2 %    ABS. NEUTROPHILS 3.6 1.8 - 8.0 K/UL    ABS. LYMPHOCYTES 1.3 0.9 - 3.6 K/UL    ABS. MONOCYTES 0.5 0.05 - 1.2 K/UL    ABS. EOSINOPHILS 0.2 0.0 - 0.4 K/UL    ABS.  BASOPHILS 0.1 (H) 0.0 - 0.06 K/UL    DF AUTOMATED             []  Vancomycin []  Invanz     []  Cubicin     [x]  Rocephin 2 grmas    was infused at 100 ml/hr. No s/s reaction noted. Each lumen flushed with 10 ml NS and 2.5 ml of 100 units/ml Heparin, then wrapped and secured. Mr. Woody Ordonez tolerated infusion, and had no complaints at this time. Patient armband removed and shredded. Mr. Woody Ordonez was discharged from [place] [address] in stable condition at 1240. He is to return on 3/28/2017 at 1100 for his next appointment for abx infusion.     Casper Mobley RN  March 27, 2017  1:09 PM

## 2017-03-28 ENCOUNTER — HOSPITAL ENCOUNTER (OUTPATIENT)
Dept: INFUSION THERAPY | Age: 57
Discharge: HOME | End: 2017-03-28
Payer: MEDICARE

## 2017-03-28 VITALS
RESPIRATION RATE: 18 BRPM | OXYGEN SATURATION: 97 % | SYSTOLIC BLOOD PRESSURE: 136 MMHG | TEMPERATURE: 97.8 F | DIASTOLIC BLOOD PRESSURE: 84 MMHG | HEART RATE: 74 BPM

## 2017-03-28 PROCEDURE — 96365 THER/PROPH/DIAG IV INF INIT: CPT

## 2017-03-28 PROCEDURE — 74011250636 HC RX REV CODE- 250/636: Performed by: INTERNAL MEDICINE

## 2017-03-28 PROCEDURE — 74011000258 HC RX REV CODE- 258: Performed by: PHYSICIAN ASSISTANT

## 2017-03-28 PROCEDURE — 74011250636 HC RX REV CODE- 250/636: Performed by: PHYSICIAN ASSISTANT

## 2017-03-28 RX ORDER — HEPARIN 100 UNIT/ML
500 SYRINGE INTRAVENOUS ONCE
Status: COMPLETED | OUTPATIENT
Start: 2017-03-28 | End: 2017-03-28

## 2017-03-28 RX ORDER — LISINOPRIL 2.5 MG/1
2.5 TABLET ORAL DAILY
COMMUNITY

## 2017-03-28 RX ORDER — SODIUM CHLORIDE 0.9 % (FLUSH) 0.9 %
10 SYRINGE (ML) INJECTION AS NEEDED
Status: DISCONTINUED | OUTPATIENT
Start: 2017-03-28 | End: 2017-04-01 | Stop reason: HOSPADM

## 2017-03-28 RX ADMIN — CEFTRIAXONE SODIUM 2 G: 2 INJECTION, POWDER, FOR SOLUTION INTRAMUSCULAR; INTRAVENOUS at 11:11

## 2017-03-28 RX ADMIN — Medication 10 ML: at 11:53

## 2017-03-28 RX ADMIN — Medication 10 ML: at 11:10

## 2017-03-28 RX ADMIN — HEPARIN 500 UNITS: 100 SYRINGE at 11:54

## 2017-03-28 NOTE — PROGRESS NOTES
[facility] Progress Note    Date: 2017    Name: Andrew Castillo    MRN: [id number]         : 1960      Mr. Dipika Parrish arrived to [facility] at 1100. Mr. Dipika Parrish was assessed and education was provided. Patient is being treated for a blood clot in his left leg. Left leg remains swollen. Both legs are cool and pulses palpable. Mr. Viv Warren vitals were reviewed. Visit Vitals    /84 (BP 1 Location: Left arm, BP Patient Position: Sitting)    Pulse 74    Temp 97.8 °F (36.6 °C)    Resp 18    SpO2 97%       Pt with Right upper arm double lumen PICC line. Each lumen flushes without difficulty and positive for blood return. Dressing CDI. No redness, bruising, or swelling noted at site and/ or extremity. Pt denies tenderness. Rocephin 2g was administered as ordered via purple lumen followed by normal saline flush. Mr. Dipika Parrish tolerated well without complaints. Flushed each lumen of pt's PICC line with heparin per order. Lumens wrapped in gauze and paper tape. Mr. Dipika Parrish was discharged from [place] [address] in stable condition at 1155. He is to return on 2017 at 1300 for his next appointment.     Edmar Turpin RN  2017

## 2017-03-29 ENCOUNTER — HOSPITAL ENCOUNTER (OUTPATIENT)
Dept: INFUSION THERAPY | Age: 57
Discharge: HOME | End: 2017-03-29
Payer: MEDICARE

## 2017-03-29 VITALS
SYSTOLIC BLOOD PRESSURE: 125 MMHG | HEART RATE: 73 BPM | RESPIRATION RATE: 18 BRPM | DIASTOLIC BLOOD PRESSURE: 87 MMHG | TEMPERATURE: 98.3 F | OXYGEN SATURATION: 95 %

## 2017-03-29 PROCEDURE — 96365 THER/PROPH/DIAG IV INF INIT: CPT

## 2017-03-29 PROCEDURE — 74011250636 HC RX REV CODE- 250/636: Performed by: INTERNAL MEDICINE

## 2017-03-29 PROCEDURE — 74011000258 HC RX REV CODE- 258: Performed by: PHYSICIAN ASSISTANT

## 2017-03-29 PROCEDURE — 77030020847 HC STATLOK BARD -A

## 2017-03-29 PROCEDURE — 74011250636 HC RX REV CODE- 250/636: Performed by: PHYSICIAN ASSISTANT

## 2017-03-29 RX ORDER — HEPARIN 100 UNIT/ML
500 SYRINGE INTRAVENOUS AS NEEDED
Status: DISCONTINUED | OUTPATIENT
Start: 2017-03-29 | End: 2017-04-02 | Stop reason: HOSPADM

## 2017-03-29 RX ORDER — SODIUM CHLORIDE 0.9 % (FLUSH) 0.9 %
10-40 SYRINGE (ML) INJECTION AS NEEDED
Status: DISCONTINUED | OUTPATIENT
Start: 2017-03-29 | End: 2017-04-02 | Stop reason: HOSPADM

## 2017-03-29 RX ADMIN — Medication 500 UNITS: at 13:45

## 2017-03-29 RX ADMIN — Medication 20 ML: at 13:13

## 2017-03-29 RX ADMIN — CEFTRIAXONE SODIUM 2 G: 2 INJECTION, POWDER, FOR SOLUTION INTRAMUSCULAR; INTRAVENOUS at 13:13

## 2017-03-29 RX ADMIN — Medication 20 ML: at 13:44

## 2017-03-29 NOTE — PROGRESS NOTES
[facility] Progress Note    Date: 2017    Name: Isabel Weiner    MRN: [id number]         : 1960      Mr. Bailey Gonsalez arrived to [facility] at . Mr. Bailey Gonsalez was assessed and education was provided. Mr. Gina Doty vitals were reviewed. Visit Vitals    /87 (BP 1 Location: Left arm, BP Patient Position: Sitting)    Pulse 73    Temp 98.3 °F (36.8 °C)    Resp 18    SpO2 95%       Pt with right upper arm double lumen PICC line. Each lumen flushes without difficulty and positive for blood return. Drsg loose/ wet around top edge. Pt states he got it wet. No redness, bruising, or swelling noted at site and/ or extremity. Pt denies tenderness. Ceftriaxone 2gm was administered as ordered via purple lumen followed by normal saline flush. PICC line dressing changed during infusion and end caps changed upon completion of infusion. Mr. Bailey Gonsalez tolerated well without complaints. Flushed each lumen of pt's PICC line with heparin per order. Wrapped lumens with gauze & paper tape. New netted sleeve provided for pt. Mr. Bailey Gonsalez was discharged from [place] [address] in stable condition at 1350. He is to return on 3/30/17 at 0930 for his next appointment.     Sachin Sanchez RN  2017

## 2017-03-30 ENCOUNTER — HOSPITAL ENCOUNTER (OUTPATIENT)
Dept: INFUSION THERAPY | Age: 57
Discharge: HOME | End: 2017-03-30
Payer: MEDICARE

## 2017-03-30 VITALS
HEART RATE: 93 BPM | DIASTOLIC BLOOD PRESSURE: 77 MMHG | TEMPERATURE: 97.2 F | RESPIRATION RATE: 18 BRPM | OXYGEN SATURATION: 99 % | SYSTOLIC BLOOD PRESSURE: 113 MMHG

## 2017-03-30 PROCEDURE — 74011250636 HC RX REV CODE- 250/636: Performed by: PHYSICIAN ASSISTANT

## 2017-03-30 PROCEDURE — 96365 THER/PROPH/DIAG IV INF INIT: CPT

## 2017-03-30 PROCEDURE — 74011250636 HC RX REV CODE- 250/636: Performed by: INTERNAL MEDICINE

## 2017-03-30 PROCEDURE — 74011000258 HC RX REV CODE- 258: Performed by: PHYSICIAN ASSISTANT

## 2017-03-30 RX ORDER — SODIUM CHLORIDE 0.9 % (FLUSH) 0.9 %
10 SYRINGE (ML) INJECTION AS NEEDED
Status: DISCONTINUED | OUTPATIENT
Start: 2017-03-30 | End: 2017-04-03 | Stop reason: HOSPADM

## 2017-03-30 RX ORDER — HEPARIN 100 UNIT/ML
500 SYRINGE INTRAVENOUS ONCE
Status: COMPLETED | OUTPATIENT
Start: 2017-03-30 | End: 2017-03-30

## 2017-03-30 RX ADMIN — CEFTRIAXONE SODIUM 2 G: 2 INJECTION, POWDER, FOR SOLUTION INTRAMUSCULAR; INTRAVENOUS at 09:45

## 2017-03-30 RX ADMIN — HEPARIN 500 UNITS: 100 SYRINGE at 10:29

## 2017-03-30 RX ADMIN — Medication 10 ML: at 10:27

## 2017-03-30 RX ADMIN — Medication 10 ML: at 09:44

## 2017-03-30 NOTE — PROGRESS NOTES
[facility] Progress Note    Date: 2017    Name: Eliane Peres    MRN: [id number]         : 1960      Mr. Woody Ordonez arrived to [facility] at 56. Mr. Woody Ordonez was assessed and education was provided. Mr. Ling Rincon vitals were reviewed. Visit Vitals    /77 (BP 1 Location: Left arm, BP Patient Position: Sitting)    Pulse 93    Temp 97.2 °F (36.2 °C)    Resp 18    SpO2 99%       Pt with Right upper arm double lumen PICC line. Each lumen flushes without difficulty and positive for blood return. Dressing CDI. No redness, bruising, or swelling noted at site and/ or extremity. Pt denies tenderness. Rocephin 2 g was administered as ordered via purple lumen followed by normal saline flush. Mr. Woody Ordonez tolerated well without complaints. Flushed each lumen of pt's PICC line with heparin per order. Lumens wrapped in gauze and paper tape. Mr. Woody Ordonez was discharged from [place] [address] in stable condition at 1030. He is to return on 2017 at 1330 for his next appointment.     Lucas Bernstein RN  2017

## 2017-03-31 ENCOUNTER — HOSPITAL ENCOUNTER (OUTPATIENT)
Dept: INFUSION THERAPY | Age: 57
Discharge: HOME | End: 2017-03-31
Payer: MEDICARE

## 2017-03-31 VITALS
HEART RATE: 92 BPM | RESPIRATION RATE: 18 BRPM | OXYGEN SATURATION: 98 % | DIASTOLIC BLOOD PRESSURE: 62 MMHG | SYSTOLIC BLOOD PRESSURE: 97 MMHG | TEMPERATURE: 97.3 F

## 2017-03-31 PROCEDURE — 74011250636 HC RX REV CODE- 250/636: Performed by: INTERNAL MEDICINE

## 2017-03-31 PROCEDURE — 74011000258 HC RX REV CODE- 258: Performed by: PHYSICIAN ASSISTANT

## 2017-03-31 PROCEDURE — 96365 THER/PROPH/DIAG IV INF INIT: CPT

## 2017-03-31 PROCEDURE — 74011250636 HC RX REV CODE- 250/636: Performed by: PHYSICIAN ASSISTANT

## 2017-03-31 RX ORDER — SODIUM CHLORIDE 0.9 % (FLUSH) 0.9 %
5-10 SYRINGE (ML) INJECTION AS NEEDED
Status: DISCONTINUED | OUTPATIENT
Start: 2017-03-31 | End: 2017-04-04 | Stop reason: HOSPADM

## 2017-03-31 RX ORDER — HEPARIN 100 UNIT/ML
500 SYRINGE INTRAVENOUS ONCE
Status: COMPLETED | OUTPATIENT
Start: 2017-03-31 | End: 2017-03-31

## 2017-03-31 RX ADMIN — Medication 500 UNITS: at 14:15

## 2017-03-31 RX ADMIN — CEFTRIAXONE SODIUM 2 G: 2 INJECTION, POWDER, FOR SOLUTION INTRAMUSCULAR; INTRAVENOUS at 13:36

## 2017-03-31 RX ADMIN — Medication 10 ML: at 14:15

## 2017-03-31 NOTE — PROGRESS NOTES
[facility] Progress Note    Date: 2017    Name: Lindsay Gregory    MRN: [id number]         : 1960    Mr. Polly Celeste was assessed and education was provided. Mr. Michelle Brenner vitals were reviewed. Visit Vitals    BP 97/62 (BP 1 Location: Left arm, BP Patient Position: Sitting)    Pulse 92    Temp 97.3 °F (36.3 °C)    Resp 18    SpO2 98%       Lab results were obtained and reviewed. No results found for this or any previous visit (from the past 12 hour(s)). []  Vancomycin     []  Invanz     []  Cubicin     [x]  Rocephin 2 grams IV    was infused at  100 ml/hr. Mr. Polly Celeste tolerated infusion, and had no complaints at this time. Patient armband removed and shredded. Mr. Polly Celeste was discharged from [place] [address] in stable condition at 1420. He is to return on 17 for his next appointment.     Andrew Stephens RN  2017  2:24 PM

## 2017-04-01 ENCOUNTER — HOSPITAL ENCOUNTER (OUTPATIENT)
Dept: INFUSION THERAPY | Age: 57
Discharge: HOME | End: 2017-04-01
Payer: MEDICARE

## 2017-04-01 VITALS
SYSTOLIC BLOOD PRESSURE: 130 MMHG | OXYGEN SATURATION: 100 % | TEMPERATURE: 98.3 F | RESPIRATION RATE: 18 BRPM | DIASTOLIC BLOOD PRESSURE: 79 MMHG | HEART RATE: 80 BPM

## 2017-04-01 PROCEDURE — 74011000258 HC RX REV CODE- 258: Performed by: PHYSICIAN ASSISTANT

## 2017-04-01 PROCEDURE — 74011250636 HC RX REV CODE- 250/636: Performed by: PHYSICIAN ASSISTANT

## 2017-04-01 PROCEDURE — 96365 THER/PROPH/DIAG IV INF INIT: CPT

## 2017-04-01 PROCEDURE — 74011250636 HC RX REV CODE- 250/636: Performed by: INTERNAL MEDICINE

## 2017-04-01 RX ORDER — HEPARIN 100 UNIT/ML
500 SYRINGE INTRAVENOUS ONCE
Status: CANCELLED | OUTPATIENT
Start: 2017-04-02 | End: 2017-04-02

## 2017-04-01 RX ORDER — SODIUM CHLORIDE 0.9 % (FLUSH) 0.9 %
10-40 SYRINGE (ML) INJECTION AS NEEDED
Status: CANCELLED | OUTPATIENT
Start: 2017-04-02

## 2017-04-01 RX ORDER — HEPARIN 100 UNIT/ML
500 SYRINGE INTRAVENOUS ONCE
Status: COMPLETED | OUTPATIENT
Start: 2017-04-01 | End: 2017-04-01

## 2017-04-01 RX ORDER — SODIUM CHLORIDE 0.9 % (FLUSH) 0.9 %
10-40 SYRINGE (ML) INJECTION AS NEEDED
Status: DISCONTINUED | OUTPATIENT
Start: 2017-04-01 | End: 2017-04-05 | Stop reason: HOSPADM

## 2017-04-01 RX ADMIN — HEPARIN 500 UNITS: 100 SYRINGE at 09:55

## 2017-04-01 RX ADMIN — CEFTRIAXONE SODIUM 2 G: 2 INJECTION, POWDER, FOR SOLUTION INTRAMUSCULAR; INTRAVENOUS at 09:23

## 2017-04-01 RX ADMIN — Medication 20 ML: at 09:20

## 2017-04-01 RX ADMIN — Medication 10 ML: at 09:54

## 2017-04-01 NOTE — PROGRESS NOTES
Name: Shayla Llamas     MRN: [id number]          : 1960     Mr. Jose Solomon was assessed and education was provided.      Mr. Taylor's vitals were reviewed. Visit Vitals    /79 (BP 1 Location: Left arm, BP Patient Position: Sitting)    Pulse 80    Temp 98.3 °F (36.8 °C)    Resp 18    SpO2 100%          []  Vancomycin   [] Invanz   [] Cubicin   [x] Rocephin 2 grams IV   was infused at 100 ml/hr.     Mr. Taylor tolerated infusion, and had no complaints at this time. Patient armband removed and shredded.     Mr. Jose Solomon was discharged from [place] [address] in stable condition at 1000.  He is to return on 17 for his next appointment.     Hadley Barrow RN  2017

## 2017-04-02 ENCOUNTER — HOSPITAL ENCOUNTER (OUTPATIENT)
Dept: INFUSION THERAPY | Age: 57
Discharge: HOME | End: 2017-04-02
Payer: MEDICARE

## 2017-04-02 VITALS
SYSTOLIC BLOOD PRESSURE: 129 MMHG | DIASTOLIC BLOOD PRESSURE: 69 MMHG | RESPIRATION RATE: 18 BRPM | TEMPERATURE: 98.4 F | OXYGEN SATURATION: 100 % | HEART RATE: 82 BPM

## 2017-04-02 PROCEDURE — 74011250636 HC RX REV CODE- 250/636: Performed by: PHYSICIAN ASSISTANT

## 2017-04-02 PROCEDURE — 74011000258 HC RX REV CODE- 258: Performed by: PHYSICIAN ASSISTANT

## 2017-04-02 PROCEDURE — 96365 THER/PROPH/DIAG IV INF INIT: CPT

## 2017-04-02 PROCEDURE — 74011250636 HC RX REV CODE- 250/636: Performed by: INTERNAL MEDICINE

## 2017-04-02 RX ORDER — HEPARIN 100 UNIT/ML
500 SYRINGE INTRAVENOUS ONCE
Status: COMPLETED | OUTPATIENT
Start: 2017-04-02 | End: 2017-04-02

## 2017-04-02 RX ORDER — SODIUM CHLORIDE 0.9 % (FLUSH) 0.9 %
10-40 SYRINGE (ML) INJECTION AS NEEDED
Status: DISCONTINUED | OUTPATIENT
Start: 2017-04-02 | End: 2017-04-06 | Stop reason: HOSPADM

## 2017-04-02 RX ADMIN — Medication 20 ML: at 09:32

## 2017-04-02 RX ADMIN — CEFTRIAXONE SODIUM 2 G: 2 INJECTION, POWDER, FOR SOLUTION INTRAMUSCULAR; INTRAVENOUS at 09:34

## 2017-04-02 RX ADMIN — HEPARIN 500 UNITS: 100 SYRINGE at 10:07

## 2017-04-02 RX ADMIN — Medication 10 ML: at 10:06

## 2017-04-02 NOTE — PROGRESS NOTES
Name: Bekah Flynn  MRN: [id number]       : 1960      Mr. Cee Hightower was assessed and education was provided.       Mr. Taylor's vitals were reviewed. Visit Vitals    /69 (BP 1 Location: Left arm, BP Patient Position: Sitting)    Pulse 82    Temp 98.4 °F (36.9 °C)    Resp 18    SpO2 100%              [] Vancomycin   [] Invanz   [] Cubicin   [x] Rocephin 2 grams IV   was infused at 100 ml/hr.        Right upper arm double lumen PICC line. Each lumen flushes without difficulty and positive for blood return. Dressing CDI. No redness, bruising, or swelling noted at site. Mr. Cee Hightower tolerated infusion, and had no complaints at this time. Right upper double lumen PICC flushed with 10 ml of NS and 500 units of heparin. Lumens wrapped with gauze and paper tape. Stockinette applied to site. Patient armband removed and shredded.      Mr. Cee Hightower was discharged from [place] [address] in stable condition at 1010.  He is to return on 4/3/17 for his next appointment.  Susy Lawson RN  2017

## 2017-04-03 ENCOUNTER — HOSPITAL ENCOUNTER (OUTPATIENT)
Dept: INFUSION THERAPY | Age: 57
Discharge: HOME | End: 2017-04-03
Payer: MEDICARE

## 2017-04-03 VITALS
OXYGEN SATURATION: 99 % | HEART RATE: 81 BPM | DIASTOLIC BLOOD PRESSURE: 68 MMHG | SYSTOLIC BLOOD PRESSURE: 120 MMHG | TEMPERATURE: 98.2 F | RESPIRATION RATE: 18 BRPM

## 2017-04-03 LAB
ALBUMIN SERPL BCP-MCNC: 3.7 G/DL (ref 3.4–5)
ALBUMIN/GLOB SERPL: 1.2 {RATIO} (ref 0.8–1.7)
ALP SERPL-CCNC: 73 U/L (ref 45–117)
ALT SERPL-CCNC: 19 U/L (ref 16–61)
ANION GAP BLD CALC-SCNC: 10 MMOL/L (ref 3–18)
AST SERPL W P-5'-P-CCNC: 15 U/L (ref 15–37)
BASOPHILS # BLD AUTO: 0.1 K/UL (ref 0–0.06)
BASOPHILS # BLD: 1 % (ref 0–2)
BILIRUB SERPL-MCNC: 0.8 MG/DL (ref 0.2–1)
BUN SERPL-MCNC: 19 MG/DL (ref 7–18)
BUN/CREAT SERPL: 28 (ref 12–20)
CALCIUM SERPL-MCNC: 9 MG/DL (ref 8.5–10.1)
CHLORIDE SERPL-SCNC: 104 MMOL/L (ref 100–108)
CO2 SERPL-SCNC: 27 MMOL/L (ref 21–32)
CREAT SERPL-MCNC: 0.67 MG/DL (ref 0.6–1.3)
CRP SERPL-MCNC: <0.3 MG/DL (ref 0–0.3)
DIFFERENTIAL METHOD BLD: ABNORMAL
EOSINOPHIL # BLD: 0.1 K/UL (ref 0–0.4)
EOSINOPHIL NFR BLD: 1 % (ref 0–5)
ERYTHROCYTE [DISTWIDTH] IN BLOOD BY AUTOMATED COUNT: 15 % (ref 11.6–14.5)
ERYTHROCYTE [SEDIMENTATION RATE] IN BLOOD: 25 MM/HR (ref 0–20)
GLOBULIN SER CALC-MCNC: 3 G/DL (ref 2–4)
GLUCOSE SERPL-MCNC: 108 MG/DL (ref 74–99)
HCT VFR BLD AUTO: 31.7 % (ref 36–48)
HGB BLD-MCNC: 10 G/DL (ref 13–16)
LYMPHOCYTES # BLD AUTO: 21 % (ref 21–52)
LYMPHOCYTES # BLD: 2 K/UL (ref 0.9–3.6)
MCH RBC QN AUTO: 27.6 PG (ref 24–34)
MCHC RBC AUTO-ENTMCNC: 31.5 G/DL (ref 31–37)
MCV RBC AUTO: 87.6 FL (ref 74–97)
MONOCYTES # BLD: 0.7 K/UL (ref 0.05–1.2)
MONOCYTES NFR BLD AUTO: 7 % (ref 3–10)
NEUTS SEG # BLD: 6.7 K/UL (ref 1.8–8)
NEUTS SEG NFR BLD AUTO: 70 % (ref 40–73)
PLATELET # BLD AUTO: 242 K/UL (ref 135–420)
PMV BLD AUTO: 11.1 FL (ref 9.2–11.8)
POTASSIUM SERPL-SCNC: 3.7 MMOL/L (ref 3.5–5.5)
PROT SERPL-MCNC: 6.7 G/DL (ref 6.4–8.2)
RBC # BLD AUTO: 3.62 M/UL (ref 4.7–5.5)
SODIUM SERPL-SCNC: 141 MMOL/L (ref 136–145)
WBC # BLD AUTO: 9.6 K/UL (ref 4.6–13.2)

## 2017-04-03 PROCEDURE — 85025 COMPLETE CBC W/AUTO DIFF WBC: CPT | Performed by: INTERNAL MEDICINE

## 2017-04-03 PROCEDURE — 96365 THER/PROPH/DIAG IV INF INIT: CPT

## 2017-04-03 PROCEDURE — 74011250636 HC RX REV CODE- 250/636: Performed by: INTERNAL MEDICINE

## 2017-04-03 PROCEDURE — 74011000258 HC RX REV CODE- 258: Performed by: PHYSICIAN ASSISTANT

## 2017-04-03 PROCEDURE — 80053 COMPREHEN METABOLIC PANEL: CPT | Performed by: INTERNAL MEDICINE

## 2017-04-03 PROCEDURE — 86140 C-REACTIVE PROTEIN: CPT | Performed by: INTERNAL MEDICINE

## 2017-04-03 PROCEDURE — 85652 RBC SED RATE AUTOMATED: CPT | Performed by: INTERNAL MEDICINE

## 2017-04-03 PROCEDURE — 74011250636 HC RX REV CODE- 250/636: Performed by: PHYSICIAN ASSISTANT

## 2017-04-03 RX ORDER — HEPARIN 100 UNIT/ML
500 SYRINGE INTRAVENOUS AS NEEDED
Status: DISCONTINUED | OUTPATIENT
Start: 2017-04-03 | End: 2017-04-03 | Stop reason: CLARIF

## 2017-04-03 RX ORDER — HEPARIN 100 UNIT/ML
500 SYRINGE INTRAVENOUS AS NEEDED
Status: DISCONTINUED | OUTPATIENT
Start: 2017-04-03 | End: 2017-04-07 | Stop reason: HOSPADM

## 2017-04-03 RX ORDER — SODIUM CHLORIDE 0.9 % (FLUSH) 0.9 %
10-40 SYRINGE (ML) INJECTION AS NEEDED
Status: DISCONTINUED | OUTPATIENT
Start: 2017-04-03 | End: 2017-04-07 | Stop reason: HOSPADM

## 2017-04-03 RX ORDER — SODIUM CHLORIDE 0.9 % (FLUSH) 0.9 %
10-40 SYRINGE (ML) INJECTION AS NEEDED
Status: DISCONTINUED | OUTPATIENT
Start: 2017-04-03 | End: 2017-04-03 | Stop reason: CLARIF

## 2017-04-03 RX ADMIN — CEFTRIAXONE SODIUM 2 G: 2 INJECTION, POWDER, FOR SOLUTION INTRAMUSCULAR; INTRAVENOUS at 15:09

## 2017-04-03 RX ADMIN — Medication 30 ML: at 15:05

## 2017-04-03 RX ADMIN — Medication 20 ML: at 15:41

## 2017-04-03 RX ADMIN — Medication 500 UNITS: at 15:42

## 2017-04-03 NOTE — PROGRESS NOTES
[facility] Progress Note    Date: April 3, 2017    Name: Jyotsna Iglesias    MRN: [id number]         : 1960    Mr. Chandler Ryan was assessed and education was provided. Mr. Devonte To vitals were reviewed. Visit Vitals    /68 (BP 1 Location: Left arm, BP Patient Position: Sitting)    Pulse 81    Temp 98.2 °F (36.8 °C)    Resp 18    SpO2 99%     Good blood return obtained from each lumen of PICC line at right upper arm, then flushed with 10 ml NS. Labs drawn from red lumen then flushed again with 10 ml NS. Lab results were obtained and reviewed. Recent Results (from the past 12 hour(s))   METABOLIC PANEL, COMPREHENSIVE    Collection Time: 17  3:00 PM   Result Value Ref Range    Sodium 141 136 - 145 mmol/L    Potassium 3.7 3.5 - 5.5 mmol/L    Chloride 104 100 - 108 mmol/L    CO2 27 21 - 32 mmol/L    Anion gap 10 3.0 - 18 mmol/L    Glucose 108 (H) 74 - 99 mg/dL    BUN 19 (H) 7.0 - 18 MG/DL    Creatinine 0.67 0.6 - 1.3 MG/DL    BUN/Creatinine ratio 28 (H) 12 - 20      GFR est AA >60 >60 ml/min/1.73m2    GFR est non-AA >60 >60 ml/min/1.73m2    Calcium 9.0 8.5 - 10.1 MG/DL    Bilirubin, total PENDING MG/DL    ALT (SGPT) PENDING U/L    AST (SGOT) PENDING U/L    Alk. phosphatase PENDING U/L    Protein, total PENDING g/dL    Albumin PENDING g/dL    Globulin PENDING g/dL    A-G Ratio PENDING     CBC WITH AUTOMATED DIFF    Collection Time: 17  3:00 PM   Result Value Ref Range    WBC 9.6 4.6 - 13.2 K/uL    RBC 3.62 (L) 4.70 - 5.50 M/uL    HGB 10.0 (L) 13.0 - 16.0 g/dL    HCT 31.7 (L) 36.0 - 48.0 %    MCV 87.6 74.0 - 97.0 FL    MCH 27.6 24.0 - 34.0 PG    MCHC 31.5 31.0 - 37.0 g/dL    RDW 15.0 (H) 11.6 - 14.5 %    PLATELET 863 124 - 761 K/uL    MPV 11.1 9.2 - 11.8 FL    NEUTROPHILS 70 40 - 73 %    LYMPHOCYTES 21 21 - 52 %    MONOCYTES 7 3 - 10 %    EOSINOPHILS 1 0 - 5 %    BASOPHILS 1 0 - 2 %    ABS. NEUTROPHILS 6.7 1.8 - 8.0 K/UL    ABS. LYMPHOCYTES 2.0 0.9 - 3.6 K/UL    ABS. MONOCYTES 0.7 0.05 - 1.2 K/UL    ABS. EOSINOPHILS 0.1 0.0 - 0.4 K/UL    ABS. BASOPHILS 0.1 (H) 0.0 - 0.06 K/UL    DF AUTOMATED             []  Vancomycin     []  Invanz     []  Cubicin     [x]  Rocephin 2 grams    was infused at 100 ml/hr. No s/s reaction noted. Each lumen flushed with 10 ml NS and 2.5 ml of 100 units/ml Heparin, then wrapped and secured. Mr. Antonio Pink tolerated infusion, and had no complaints at this time. Patient armband removed and shredded. Mr. Antonio Pink was discharged from [place] [address] in stable condition at [phone]. He is to return on 4/4/2017 at 1000 for his next appointment for abx.     Sarahy Christine RN  April 3, 2017  4:05 PM

## 2017-04-04 ENCOUNTER — HOSPITAL ENCOUNTER (OUTPATIENT)
Dept: INFUSION THERAPY | Age: 57
Discharge: HOME | End: 2017-04-04
Payer: MEDICARE

## 2017-04-04 VITALS
RESPIRATION RATE: 18 BRPM | TEMPERATURE: 97.9 F | DIASTOLIC BLOOD PRESSURE: 71 MMHG | HEART RATE: 81 BPM | OXYGEN SATURATION: 98 % | SYSTOLIC BLOOD PRESSURE: 118 MMHG

## 2017-04-04 PROCEDURE — 74011250636 HC RX REV CODE- 250/636: Performed by: INTERNAL MEDICINE

## 2017-04-04 PROCEDURE — 96365 THER/PROPH/DIAG IV INF INIT: CPT

## 2017-04-04 PROCEDURE — 74011000258 HC RX REV CODE- 258: Performed by: PHYSICIAN ASSISTANT

## 2017-04-04 PROCEDURE — 74011250636 HC RX REV CODE- 250/636: Performed by: PHYSICIAN ASSISTANT

## 2017-04-04 RX ORDER — SODIUM CHLORIDE 0.9 % (FLUSH) 0.9 %
10-40 SYRINGE (ML) INJECTION AS NEEDED
Status: DISCONTINUED | OUTPATIENT
Start: 2017-04-04 | End: 2017-04-08 | Stop reason: HOSPADM

## 2017-04-04 RX ORDER — HEPARIN 100 UNIT/ML
500 SYRINGE INTRAVENOUS ONCE
Status: COMPLETED | OUTPATIENT
Start: 2017-04-04 | End: 2017-04-04

## 2017-04-04 RX ADMIN — CEFTRIAXONE SODIUM 2 G: 2 INJECTION, POWDER, FOR SOLUTION INTRAMUSCULAR; INTRAVENOUS at 10:13

## 2017-04-04 RX ADMIN — Medication 10 ML: at 10:44

## 2017-04-04 RX ADMIN — HEPARIN 500 UNITS: 100 SYRINGE at 10:44

## 2017-04-05 ENCOUNTER — HOSPITAL ENCOUNTER (OUTPATIENT)
Dept: INFUSION THERAPY | Age: 57
Discharge: HOME | End: 2017-04-05
Payer: MEDICARE

## 2017-04-05 VITALS
RESPIRATION RATE: 18 BRPM | HEART RATE: 86 BPM | TEMPERATURE: 98.1 F | OXYGEN SATURATION: 98 % | BODY MASS INDEX: 27.13 KG/M2 | DIASTOLIC BLOOD PRESSURE: 60 MMHG | SYSTOLIC BLOOD PRESSURE: 101 MMHG | WEIGHT: 178.4 LBS

## 2017-04-05 PROCEDURE — 77030020847 HC STATLOK BARD -A

## 2017-04-05 PROCEDURE — 74011250636 HC RX REV CODE- 250/636: Performed by: INTERNAL MEDICINE

## 2017-04-05 PROCEDURE — 96365 THER/PROPH/DIAG IV INF INIT: CPT

## 2017-04-05 PROCEDURE — 74011000258 HC RX REV CODE- 258: Performed by: INTERNAL MEDICINE

## 2017-04-05 RX ORDER — SODIUM CHLORIDE 0.9 % (FLUSH) 0.9 %
10-40 SYRINGE (ML) INJECTION AS NEEDED
Status: DISCONTINUED | OUTPATIENT
Start: 2017-04-05 | End: 2017-04-09 | Stop reason: HOSPADM

## 2017-04-05 RX ORDER — HEPARIN 100 UNIT/ML
500 SYRINGE INTRAVENOUS AS NEEDED
Status: DISCONTINUED | OUTPATIENT
Start: 2017-04-05 | End: 2017-04-09 | Stop reason: HOSPADM

## 2017-04-05 RX ADMIN — Medication 10 ML: at 10:55

## 2017-04-05 RX ADMIN — CEFTRIAXONE SODIUM 2 G: 2 INJECTION, POWDER, FOR SOLUTION INTRAMUSCULAR; INTRAVENOUS at 10:23

## 2017-04-05 RX ADMIN — Medication 20 ML: at 10:23

## 2017-04-05 RX ADMIN — Medication 500 UNITS: at 10:56

## 2017-04-05 NOTE — PROGRESS NOTES
[facility] Progress Note    Date: 2017    Name: Urmila Garcia    MRN: [id number]         : 1960      Mr. Ryan Ortega arrived to [facility] at 1020. Mr. Ryan Ortega was assessed and education was provided. Mr. Jose C Madden vitals were reviewed. Visit Vitals    /60 (BP 1 Location: Left arm, BP Patient Position: Sitting)    Pulse 86    Temp 98.1 °F (36.7 °C)    Resp 18    SpO2 98%       Pt with right upper arm double lumen PICC line. Each lumen flushes without difficulty and positive for blood return. Drsg appears to have gotten wet. Pt states he got it wet again. Pt states he has been wrapping it with plastic but the dressing still manages to get wet somehow. Pt states he is going to look into other methods of how to keep it dry. No redness, bruising, or swelling noted at site and/ or extremity. Pt denies tenderness. End caps changed at this time. Ceftriaxone 2gm was administered as ordered via purple lumen followed by normal saline flush. PICC line dressing changed during infusion. Mr. Ryan Ortega tolerated well without complaints. Flushed each lumen of pt's PICC line with heparin per order. Wrapped lumens with gauze & paper tape. New netted sleeve provided for pt. Mr. Ryan Ortega was discharged from [place] [address] in stable condition at 1100. He is to return on 17 at 1130 for his next appointment.     Zarina Sierra RN  2017

## 2017-04-06 ENCOUNTER — HOSPITAL ENCOUNTER (OUTPATIENT)
Dept: INFUSION THERAPY | Age: 57
Discharge: HOME | End: 2017-04-06
Payer: MEDICARE

## 2017-04-06 VITALS
OXYGEN SATURATION: 98 % | TEMPERATURE: 97.6 F | DIASTOLIC BLOOD PRESSURE: 79 MMHG | SYSTOLIC BLOOD PRESSURE: 111 MMHG | RESPIRATION RATE: 18 BRPM

## 2017-04-06 PROCEDURE — 74011000258 HC RX REV CODE- 258: Performed by: PHYSICIAN ASSISTANT

## 2017-04-06 PROCEDURE — 96365 THER/PROPH/DIAG IV INF INIT: CPT

## 2017-04-06 PROCEDURE — 74011250636 HC RX REV CODE- 250/636: Performed by: PHYSICIAN ASSISTANT

## 2017-04-06 PROCEDURE — 74011250636 HC RX REV CODE- 250/636: Performed by: INTERNAL MEDICINE

## 2017-04-06 RX ORDER — HEPARIN 100 UNIT/ML
500 SYRINGE INTRAVENOUS ONCE
Status: COMPLETED | OUTPATIENT
Start: 2017-04-06 | End: 2017-04-06

## 2017-04-06 RX ORDER — SODIUM CHLORIDE 0.9 % (FLUSH) 0.9 %
10-40 SYRINGE (ML) INJECTION AS NEEDED
Status: DISCONTINUED | OUTPATIENT
Start: 2017-04-06 | End: 2017-04-10 | Stop reason: HOSPADM

## 2017-04-06 RX ADMIN — Medication 10 ML: at 12:09

## 2017-04-06 RX ADMIN — CEFTRIAXONE SODIUM 2 G: 2 INJECTION, POWDER, FOR SOLUTION INTRAMUSCULAR; INTRAVENOUS at 11:36

## 2017-04-06 RX ADMIN — HEPARIN 500 UNITS: 100 SYRINGE at 12:09

## 2017-04-06 NOTE — PROGRESS NOTES
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 Pt was assessed and education was provided. Mr. Yue Murillo vitals were reviewed. Visit Vitals    /79 (BP 1 Location: Left leg, BP Patient Position: Sitting)    Temp 97.6 °F (36.4 °C)    Resp 18    SpO2 98%       Right upper arm PICC flushed easily and had brisk blood return via both ports. PICC dressing c/d/i and not due to be changed. No swelling, redness, streaking, warmth or drainage noted in arm. Pt denied c/o pain in arm.      []  Vancomycin     []  Invanz     []  Cubicin     [x]  Rocephin 2 grams    was infused at 100 ml/hr (over approximately 30 minutes). 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 Both lumens of PICC flushed with NS 10 ml and Heparin 250 units. Lumens wrapped with guaze and paper tape. Stockinette placed over dressing for protection. Patient armband removed and shredded. 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 He is to return on 17 at 1300 for his next antibiotic appointment.     Fuentes Covington RN  2017

## 2017-04-07 ENCOUNTER — HOSPITAL ENCOUNTER (OUTPATIENT)
Dept: INFUSION THERAPY | Age: 57
Discharge: HOME | End: 2017-04-07
Payer: MEDICARE

## 2017-04-07 VITALS
SYSTOLIC BLOOD PRESSURE: 110 MMHG | DIASTOLIC BLOOD PRESSURE: 71 MMHG | TEMPERATURE: 96.6 F | RESPIRATION RATE: 18 BRPM | HEART RATE: 76 BPM

## 2017-04-07 PROCEDURE — 74011000258 HC RX REV CODE- 258: Performed by: PHYSICIAN ASSISTANT

## 2017-04-07 PROCEDURE — 74011250636 HC RX REV CODE- 250/636: Performed by: INTERNAL MEDICINE

## 2017-04-07 PROCEDURE — 74011250636 HC RX REV CODE- 250/636: Performed by: PHYSICIAN ASSISTANT

## 2017-04-07 PROCEDURE — 96365 THER/PROPH/DIAG IV INF INIT: CPT

## 2017-04-07 RX ORDER — HEPARIN 100 UNIT/ML
500 SYRINGE INTRAVENOUS ONCE
Status: CANCELLED | OUTPATIENT
Start: 2017-04-07 | End: 2017-04-07

## 2017-04-07 RX ORDER — SODIUM CHLORIDE 0.9 % (FLUSH) 0.9 %
10 SYRINGE (ML) INJECTION AS NEEDED
Status: CANCELLED | OUTPATIENT
Start: 2017-04-07

## 2017-04-07 RX ORDER — SODIUM CHLORIDE 0.9 % (FLUSH) 0.9 %
10-40 SYRINGE (ML) INJECTION AS NEEDED
Status: DISCONTINUED | OUTPATIENT
Start: 2017-04-07 | End: 2017-04-10 | Stop reason: HOSPADM

## 2017-04-07 RX ORDER — HEPARIN 100 UNIT/ML
500 SYRINGE INTRAVENOUS AS NEEDED
Status: DISCONTINUED | OUTPATIENT
Start: 2017-04-07 | End: 2017-04-10 | Stop reason: HOSPADM

## 2017-04-07 RX ADMIN — Medication 20 ML: at 14:35

## 2017-04-07 RX ADMIN — CEFTRIAXONE SODIUM 2 G: 2 INJECTION, POWDER, FOR SOLUTION INTRAMUSCULAR; INTRAVENOUS at 14:35

## 2017-04-07 RX ADMIN — Medication 20 ML: at 15:04

## 2017-04-07 RX ADMIN — Medication 500 UNITS: at 15:04

## 2017-04-07 NOTE — PROGRESS NOTES
[facility] Progress Note    Date: 2017    Name: Jyotsna Iglesisa    MRN: [id number]         : 1960    Mr. Chandler Ryan was assessed and education was provided. Mr. Devonte To vitals were reviewed. Visit Vitals    /71 (BP 1 Location: Left arm, BP Patient Position: Sitting)    Pulse 76    Temp 96.6 °F (35.9 °C)    Resp 18       Good blood return obtained from each lumen of PICC line at right upper arm,then each lumen flushed with 10 ml NS.      []  Vancomycin     []  Invanz     []  Cubicin     [x]  Rocephin 2 grams    was infused at 100 ml/hr via red lumen. Each lumen flushed with 10 ml NS and 2.5 ml of 100 units/ml Heparin, then wrapped and secured. .    Mr. Chandler Ryan tolerated infusion, and had no complaints at this time. Patient armband removed and shredded. Mr. Chandler Ryan was discharged from [place] [address] in stable condition at 1505. He is to return on 2017 at 0900 for his next appointment for abx.     Laurie Sethi RN  2017  2:36 PM

## 2017-04-08 ENCOUNTER — HOSPITAL ENCOUNTER (OUTPATIENT)
Dept: INFUSION THERAPY | Age: 57
Discharge: HOME | End: 2017-04-08
Payer: MEDICARE

## 2017-04-08 VITALS
RESPIRATION RATE: 18 BRPM | TEMPERATURE: 97.6 F | DIASTOLIC BLOOD PRESSURE: 74 MMHG | HEART RATE: 74 BPM | SYSTOLIC BLOOD PRESSURE: 118 MMHG | OXYGEN SATURATION: 98 %

## 2017-04-08 PROCEDURE — 96365 THER/PROPH/DIAG IV INF INIT: CPT

## 2017-04-08 PROCEDURE — 74011000258 HC RX REV CODE- 258: Performed by: PHYSICIAN ASSISTANT

## 2017-04-08 PROCEDURE — 74011250636 HC RX REV CODE- 250/636: Performed by: PHYSICIAN ASSISTANT

## 2017-04-08 PROCEDURE — 74011250636 HC RX REV CODE- 250/636: Performed by: INTERNAL MEDICINE

## 2017-04-08 RX ORDER — SODIUM CHLORIDE 0.9 % (FLUSH) 0.9 %
10 SYRINGE (ML) INJECTION AS NEEDED
Status: DISCONTINUED | OUTPATIENT
Start: 2017-04-08 | End: 2017-04-12 | Stop reason: HOSPADM

## 2017-04-08 RX ORDER — HEPARIN 100 UNIT/ML
500 SYRINGE INTRAVENOUS ONCE
Status: COMPLETED | OUTPATIENT
Start: 2017-04-08 | End: 2017-04-08

## 2017-04-08 RX ADMIN — Medication 10 ML: at 10:05

## 2017-04-08 RX ADMIN — Medication 10 ML: at 09:15

## 2017-04-08 RX ADMIN — HEPARIN 500 UNITS: 100 SYRINGE at 10:05

## 2017-04-08 RX ADMIN — CEFTRIAXONE SODIUM 2 G: 2 INJECTION, POWDER, FOR SOLUTION INTRAMUSCULAR; INTRAVENOUS at 09:15

## 2017-04-08 NOTE — PROGRESS NOTES
[address] [facility] Progress Note    Date: 2017    Name: Andrew Castillo    MRN: [id number]         : 1960      Mr. Dipika Parrish arrived to [facility] at 36. Mr. Dipika Parrish was assessed and education was provided. Mr. Viv Warren vitals were reviewed. Visit Vitals    /74 (BP 1 Location: Left arm, BP Patient Position: Sitting)    Pulse 74    Temp 97.6 °F (36.4 °C)    Resp 18    SpO2 98%       Pt with Right upper arm double lumen PICC line. Each lumen flushes without difficulty and positive for blood return. Dressing CDI. No redness, bruising, or swelling noted at site and/ or extremity. Pt denies tenderness. Rocephin 2 g was administered as ordered via purple lumen followed by normal saline flush. Mr. Dipika Parrish tolerated well without complaints. Flushed each lumen of pt's PICC line with heparin per order. Lumens wrapped in gauze and paper tape. Mr. Dipika Parrish was discharged from [place] [address] in stable condition at 1005. He is to return on 2017 at 900 for his next appointment.     Edmar Turpin RN  2017

## 2017-04-09 ENCOUNTER — HOSPITAL ENCOUNTER (OUTPATIENT)
Dept: INFUSION THERAPY | Age: 57
Discharge: HOME | End: 2017-04-09
Payer: MEDICARE

## 2017-04-09 VITALS
OXYGEN SATURATION: 98 % | DIASTOLIC BLOOD PRESSURE: 66 MMHG | HEART RATE: 71 BPM | TEMPERATURE: 97 F | RESPIRATION RATE: 18 BRPM | SYSTOLIC BLOOD PRESSURE: 115 MMHG

## 2017-04-09 PROCEDURE — 96365 THER/PROPH/DIAG IV INF INIT: CPT

## 2017-04-09 PROCEDURE — 74011250636 HC RX REV CODE- 250/636: Performed by: INTERNAL MEDICINE

## 2017-04-09 PROCEDURE — 74011000258 HC RX REV CODE- 258: Performed by: INTERNAL MEDICINE

## 2017-04-09 RX ORDER — SODIUM CHLORIDE 0.9 % (FLUSH) 0.9 %
10 SYRINGE (ML) INJECTION AS NEEDED
Status: DISCONTINUED | OUTPATIENT
Start: 2017-04-09 | End: 2017-04-12 | Stop reason: HOSPADM

## 2017-04-09 RX ORDER — HEPARIN 100 UNIT/ML
500 SYRINGE INTRAVENOUS ONCE
Status: COMPLETED | OUTPATIENT
Start: 2017-04-09 | End: 2017-04-09

## 2017-04-09 RX ADMIN — Medication 10 ML: at 09:06

## 2017-04-09 RX ADMIN — CEFTRIAXONE SODIUM 2 G: 2 INJECTION, POWDER, FOR SOLUTION INTRAMUSCULAR; INTRAVENOUS at 09:06

## 2017-04-09 RX ADMIN — HEPARIN 500 UNITS: 100 SYRINGE at 09:45

## 2017-04-09 RX ADMIN — Medication 10 ML: at 09:44

## 2017-04-09 NOTE — PROGRESS NOTES
[address] [facility] Progress Note    Date: 2017    Name: Andrew Castillo    MRN: [id number]         : 1960      Mr. Dipika Parrish arrived to [facility] at 0. Mr. Dipika Parrish was assessed and education was provided. Mr. Viv Warren vitals were reviewed. Visit Vitals    /66 (BP 1 Location: Left arm, BP Patient Position: Sitting)    Pulse 71    Temp 97 °F (36.1 °C)    Resp 18    SpO2 98%       Pt with Right upper arm double lumen PICC line. Each lumen flushes without difficulty and positive for blood return. Dressing CDI. No redness, bruising, or swelling noted at site and/ or extremity. Pt denies tenderness. Rocephin 2 g was administered as ordered via purple lumen followed by normal saline flush. Mr. Dipika Parrish tolerated well without complaints. Flushed each lumen of pt's PICC line with heparin per order. Wrapped lumens with gauze and paper tape. Mr. Dipika Parrish was discharged from [place] [address] in stable condition at 950. He is to return on April 10, 2017 at 1430 for his next appointment.     Edmar Turpin RN  2017

## 2017-04-10 ENCOUNTER — HOSPITAL ENCOUNTER (OUTPATIENT)
Dept: INFUSION THERAPY | Age: 57
Discharge: HOME | End: 2017-04-10
Payer: MEDICARE

## 2017-04-10 VITALS
SYSTOLIC BLOOD PRESSURE: 106 MMHG | RESPIRATION RATE: 18 BRPM | TEMPERATURE: 97.2 F | DIASTOLIC BLOOD PRESSURE: 71 MMHG | OXYGEN SATURATION: 98 % | HEART RATE: 84 BPM

## 2017-04-10 LAB
ANION GAP BLD CALC-SCNC: 8 MMOL/L (ref 3–18)
BASOPHILS # BLD AUTO: 0.1 K/UL (ref 0–0.06)
BASOPHILS # BLD: 1 % (ref 0–2)
BUN SERPL-MCNC: 22 MG/DL (ref 7–18)
BUN/CREAT SERPL: 31 (ref 12–20)
CALCIUM SERPL-MCNC: 9.1 MG/DL (ref 8.5–10.1)
CHLORIDE SERPL-SCNC: 104 MMOL/L (ref 100–108)
CO2 SERPL-SCNC: 27 MMOL/L (ref 21–32)
CREAT SERPL-MCNC: 0.71 MG/DL (ref 0.6–1.3)
CRP SERPL-MCNC: <0.3 MG/DL (ref 0–0.3)
DIFFERENTIAL METHOD BLD: ABNORMAL
EOSINOPHIL # BLD: 0.3 K/UL (ref 0–0.4)
EOSINOPHIL NFR BLD: 4 % (ref 0–5)
ERYTHROCYTE [DISTWIDTH] IN BLOOD BY AUTOMATED COUNT: 14.4 % (ref 11.6–14.5)
ERYTHROCYTE [SEDIMENTATION RATE] IN BLOOD: 11 MM/HR (ref 0–20)
GLUCOSE SERPL-MCNC: 94 MG/DL (ref 74–99)
HCT VFR BLD AUTO: 34.2 % (ref 36–48)
HGB BLD-MCNC: 10.7 G/DL (ref 13–16)
LYMPHOCYTES # BLD AUTO: 25 % (ref 21–52)
LYMPHOCYTES # BLD: 2.1 K/UL (ref 0.9–3.6)
MCH RBC QN AUTO: 26.7 PG (ref 24–34)
MCHC RBC AUTO-ENTMCNC: 31.3 G/DL (ref 31–37)
MCV RBC AUTO: 85.3 FL (ref 74–97)
MONOCYTES # BLD: 0.6 K/UL (ref 0.05–1.2)
MONOCYTES NFR BLD AUTO: 8 % (ref 3–10)
NEUTS SEG # BLD: 5.3 K/UL (ref 1.8–8)
NEUTS SEG NFR BLD AUTO: 62 % (ref 40–73)
PLATELET # BLD AUTO: 314 K/UL (ref 135–420)
PMV BLD AUTO: 10.6 FL (ref 9.2–11.8)
POTASSIUM SERPL-SCNC: 4.2 MMOL/L (ref 3.5–5.5)
RBC # BLD AUTO: 4.01 M/UL (ref 4.7–5.5)
SODIUM SERPL-SCNC: 139 MMOL/L (ref 136–145)
WBC # BLD AUTO: 8.4 K/UL (ref 4.6–13.2)

## 2017-04-10 PROCEDURE — 85025 COMPLETE CBC W/AUTO DIFF WBC: CPT | Performed by: INTERNAL MEDICINE

## 2017-04-10 PROCEDURE — 80048 BASIC METABOLIC PNL TOTAL CA: CPT | Performed by: INTERNAL MEDICINE

## 2017-04-10 PROCEDURE — 74011250636 HC RX REV CODE- 250/636: Performed by: INTERNAL MEDICINE

## 2017-04-10 PROCEDURE — 74011000258 HC RX REV CODE- 258: Performed by: INTERNAL MEDICINE

## 2017-04-10 PROCEDURE — 85652 RBC SED RATE AUTOMATED: CPT | Performed by: INTERNAL MEDICINE

## 2017-04-10 PROCEDURE — 86140 C-REACTIVE PROTEIN: CPT | Performed by: INTERNAL MEDICINE

## 2017-04-10 PROCEDURE — 96365 THER/PROPH/DIAG IV INF INIT: CPT

## 2017-04-10 RX ORDER — SODIUM CHLORIDE 0.9 % (FLUSH) 0.9 %
10-40 SYRINGE (ML) INJECTION AS NEEDED
Status: DISCONTINUED | OUTPATIENT
Start: 2017-04-10 | End: 2017-04-13 | Stop reason: HOSPADM

## 2017-04-10 RX ORDER — HEPARIN 100 UNIT/ML
500 SYRINGE INTRAVENOUS ONCE
Status: COMPLETED | OUTPATIENT
Start: 2017-04-10 | End: 2017-04-10

## 2017-04-10 RX ADMIN — Medication 500 UNITS: at 16:17

## 2017-04-10 RX ADMIN — CEFTRIAXONE SODIUM 2 G: 2 INJECTION, POWDER, FOR SOLUTION INTRAMUSCULAR; INTRAVENOUS at 15:44

## 2017-04-10 RX ADMIN — Medication 30 ML: at 15:44

## 2017-04-10 RX ADMIN — Medication 10 ML: at 16:17

## 2017-04-10 NOTE — PROGRESS NOTES
\[facility]\"" Progress Note    Date: April 10, 2017    Name: Jyotsna Iglesias    MRN: [id number]         : 1960    Rocephin Infusion    Mr. Chandler Ryan to [facility], ambulatory at  for 1430 appointment. Pt was assessed and education was provided. Pt denies complaint of pain, skin rashes or GI distress. Mr. Devonte To vitals were reviewed. Visit Vitals    /71 (BP 1 Location: Left arm, BP Patient Position: Sitting)    Pulse 84    Temp 97.2 °F (36.2 °C)    Resp 18    SpO2 98%       Right upper arm PICC flushed easily and had brisk blood return via both ports. Blood drawn off and sent to lab for CBC, BMP, ESR and CRP after 10 ml waste per written orders. Recent Results (from the past 12 hour(s))   CBC WITH AUTOMATED DIFF    Collection Time: 04/10/17  3:43 PM   Result Value Ref Range    WBC 8.4 4.6 - 13.2 K/uL    RBC 4.01 (L) 4.70 - 5.50 M/uL    HGB 10.7 (L) 13.0 - 16.0 g/dL    HCT 34.2 (L) 36.0 - 48.0 %    MCV 85.3 74.0 - 97.0 FL    MCH 26.7 24.0 - 34.0 PG    MCHC 31.3 31.0 - 37.0 g/dL    RDW 14.4 11.6 - 14.5 %    PLATELET 532 308 - 315 K/uL    MPV 10.6 9.2 - 11.8 FL    NEUTROPHILS 62 40 - 73 %    LYMPHOCYTES 25 21 - 52 %    MONOCYTES 8 3 - 10 %    EOSINOPHILS 4 0 - 5 %    BASOPHILS 1 0 - 2 %    ABS. NEUTROPHILS 5.3 1.8 - 8.0 K/UL    ABS. LYMPHOCYTES 2.1 0.9 - 3.6 K/UL    ABS. MONOCYTES 0.6 0.05 - 1.2 K/UL    ABS. EOSINOPHILS 0.3 0.0 - 0.4 K/UL    ABS.  BASOPHILS 0.1 (H) 0.0 - 0.06 K/UL    DF AUTOMATED     METABOLIC PANEL, BASIC    Collection Time: 04/10/17  3:43 PM   Result Value Ref Range    Sodium 139 136 - 145 mmol/L    Potassium 4.2 3.5 - 5.5 mmol/L    Chloride 104 100 - 108 mmol/L    CO2 27 21 - 32 mmol/L    Anion gap 8 3.0 - 18 mmol/L    Glucose 94 74 - 99 mg/dL    BUN 22 (H) 7.0 - 18 MG/DL    Creatinine 0.71 0.6 - 1.3 MG/DL    BUN/Creatinine ratio 31 (H) 12 - 20      GFR est AA >60 >60 ml/min/1.73m2    GFR est non-AA >60 >60 ml/min/1.73m2    Calcium 9.1 8.5 - 10.1 MG/DL   SED RATE (ESR)    Collection Time: 04/10/17  3:43 PM   Result Value Ref Range    Sed rate, automated 11 0 - 20 mm/hr       PICC dressing c/d/i and not due to be changed. No swelling, redness, streaking, warmth or drainage noted in arm. Pt denied c/o pain in arm.      []  Vancomycin     []  Invanz     []  Cubicin     [x]  Rocephin 2 gm    was infused at 100 ml/hr (over approximately 30 minutes). Mr. Alonzo Navarrete tolerated infusion, and had no complaints at this time. Both lumens of PICC flushed with NS 10 ml and Heparin 250 units. Lumens wrapped with guaze and paper tape. Stockinette placed over dressing for protection. Patient armband removed and shredded. Mr. Alonzo Navarrete was discharged from [place] [address] in stable condition at 1620. He is to return on 4/11/17 at 1130 for his next antibiotic appointment.     Isaiah Barrett RN  April 10, 2017

## 2017-04-11 ENCOUNTER — HOSPITAL ENCOUNTER (OUTPATIENT)
Dept: INFUSION THERAPY | Age: 57
Discharge: HOME | End: 2017-04-11
Payer: MEDICARE

## 2017-04-11 VITALS
TEMPERATURE: 97.1 F | RESPIRATION RATE: 18 BRPM | SYSTOLIC BLOOD PRESSURE: 100 MMHG | DIASTOLIC BLOOD PRESSURE: 66 MMHG | HEART RATE: 86 BPM | OXYGEN SATURATION: 98 %

## 2017-04-11 PROCEDURE — 74011000258 HC RX REV CODE- 258: Performed by: INTERNAL MEDICINE

## 2017-04-11 PROCEDURE — 74011250636 HC RX REV CODE- 250/636: Performed by: INTERNAL MEDICINE

## 2017-04-11 PROCEDURE — 96365 THER/PROPH/DIAG IV INF INIT: CPT

## 2017-04-11 RX ORDER — SODIUM CHLORIDE 0.9 % (FLUSH) 0.9 %
10-40 SYRINGE (ML) INJECTION AS NEEDED
Status: DISCONTINUED | OUTPATIENT
Start: 2017-04-11 | End: 2017-04-15 | Stop reason: HOSPADM

## 2017-04-11 RX ORDER — HEPARIN 100 UNIT/ML
500 SYRINGE INTRAVENOUS ONCE
Status: COMPLETED | OUTPATIENT
Start: 2017-04-11 | End: 2017-04-11

## 2017-04-11 RX ADMIN — Medication 10 ML: at 12:31

## 2017-04-11 RX ADMIN — HEPARIN 500 UNITS: 100 SYRINGE at 12:32

## 2017-04-11 RX ADMIN — CEFTRIAXONE SODIUM 2 G: 2 INJECTION, POWDER, FOR SOLUTION INTRAMUSCULAR; INTRAVENOUS at 11:57

## 2017-04-11 NOTE — PROGRESS NOTES
[facility] Progress Note    Date: 2017    Name: Yo Perez    MRN: [id number]         : 1960    Rocephin Infusion    Mr. Rosado Deep to [address], ambulatory at 1140 . Pt was assessed and education was provided. Mr. Parul Iniguez vitals were reviewed. Visit Vitals    /66 (BP 1 Location: Left arm, BP Patient Position: Sitting)    Pulse 86    Temp 97.1 °F (36.2 °C)    Resp 18    SpO2 98%     Recent Results (from the past 24 hour(s))   CBC WITH AUTOMATED DIFF    Collection Time: 04/10/17  3:43 PM   Result Value Ref Range    WBC 8.4 4.6 - 13.2 K/uL    RBC 4.01 (L) 4.70 - 5.50 M/uL    HGB 10.7 (L) 13.0 - 16.0 g/dL    HCT 34.2 (L) 36.0 - 48.0 %    MCV 85.3 74.0 - 97.0 FL    MCH 26.7 24.0 - 34.0 PG    MCHC 31.3 31.0 - 37.0 g/dL    RDW 14.4 11.6 - 14.5 %    PLATELET 616 442 - 566 K/uL    MPV 10.6 9.2 - 11.8 FL    NEUTROPHILS 62 40 - 73 %    LYMPHOCYTES 25 21 - 52 %    MONOCYTES 8 3 - 10 %    EOSINOPHILS 4 0 - 5 %    BASOPHILS 1 0 - 2 %    ABS. NEUTROPHILS 5.3 1.8 - 8.0 K/UL    ABS. LYMPHOCYTES 2.1 0.9 - 3.6 K/UL    ABS. MONOCYTES 0.6 0.05 - 1.2 K/UL    ABS. EOSINOPHILS 0.3 0.0 - 0.4 K/UL    ABS.  BASOPHILS 0.1 (H) 0.0 - 0.06 K/UL    DF AUTOMATED     METABOLIC PANEL, BASIC    Collection Time: 04/10/17  3:43 PM   Result Value Ref Range    Sodium 139 136 - 145 mmol/L    Potassium 4.2 3.5 - 5.5 mmol/L    Chloride 104 100 - 108 mmol/L    CO2 27 21 - 32 mmol/L    Anion gap 8 3.0 - 18 mmol/L    Glucose 94 74 - 99 mg/dL    BUN 22 (H) 7.0 - 18 MG/DL    Creatinine 0.71 0.6 - 1.3 MG/DL    BUN/Creatinine ratio 31 (H) 12 - 20      GFR est AA >60 >60 ml/min/1.73m2    GFR est non-AA >60 >60 ml/min/1.73m2    Calcium 9.1 8.5 - 10.1 MG/DL   SED RATE (ESR)    Collection Time: 04/10/17  3:43 PM   Result Value Ref Range    Sed rate, automated 11 0 - 20 mm/hr   C REACTIVE PROTEIN, QT    Collection Time: 04/10/17  3:43 PM   Result Value Ref Range    C-Reactive protein <0.3 0 - 0.3 mg/dL         Right  upper arm PICC flushed easily and had brisk blood return via both ports. PICC dressing c/d/i and not due to be changed. Dressing reinforced on edges. No swelling, redness, streaking, warmth or drainage noted in arm. Pt denied c/o pain in arm.      []  Vancomycin     []  Invanz     []  Cubicin     [x]  Rocephin 2 grams    was infused at 100 ml/hr (over approximately 30 minutes). Mr. Natalie Brownlee tolerated infusion, and had no complaints at this time. Both lumens of PICC flushed with NS 10 ml and Heparin 250 units. Lumens wrapped with guaze and paper tape. Stockinette placed over dressing for protection. Patient armband removed and shredded. Mr. Natalie Brownlee was discharged from [place] [address] in stable condition at 1235. He is to return on 4/12/17 at 21  for his next antibiotic appointment.     Jacob Ponce RN  April 11, 2017

## 2017-04-12 ENCOUNTER — HOSPITAL ENCOUNTER (OUTPATIENT)
Dept: INFUSION THERAPY | Age: 57
Discharge: HOME | End: 2017-04-12
Payer: MEDICARE

## 2017-04-12 VITALS
DIASTOLIC BLOOD PRESSURE: 69 MMHG | OXYGEN SATURATION: 99 % | RESPIRATION RATE: 18 BRPM | TEMPERATURE: 97.5 F | HEART RATE: 91 BPM | SYSTOLIC BLOOD PRESSURE: 124 MMHG

## 2017-04-12 PROCEDURE — 74011250636 HC RX REV CODE- 250/636: Performed by: INTERNAL MEDICINE

## 2017-04-12 PROCEDURE — 74011000258 HC RX REV CODE- 258: Performed by: PHYSICIAN ASSISTANT

## 2017-04-12 PROCEDURE — 74011250636 HC RX REV CODE- 250/636: Performed by: PHYSICIAN ASSISTANT

## 2017-04-12 PROCEDURE — 77030020847 HC STATLOK BARD -A

## 2017-04-12 PROCEDURE — 96365 THER/PROPH/DIAG IV INF INIT: CPT

## 2017-04-12 RX ORDER — HEPARIN 100 UNIT/ML
500 SYRINGE INTRAVENOUS ONCE
Status: COMPLETED | OUTPATIENT
Start: 2017-04-12 | End: 2017-04-12

## 2017-04-12 RX ORDER — SODIUM CHLORIDE 0.9 % (FLUSH) 0.9 %
10 SYRINGE (ML) INJECTION AS NEEDED
Status: DISCONTINUED | OUTPATIENT
Start: 2017-04-12 | End: 2017-04-16 | Stop reason: HOSPADM

## 2017-04-12 RX ADMIN — CEFTRIAXONE SODIUM 2 G: 2 INJECTION, POWDER, FOR SOLUTION INTRAMUSCULAR; INTRAVENOUS at 10:39

## 2017-04-12 RX ADMIN — Medication 10 ML: at 11:17

## 2017-04-12 RX ADMIN — Medication 10 ML: at 10:38

## 2017-04-12 RX ADMIN — HEPARIN 500 UNITS: 100 SYRINGE at 11:17

## 2017-04-12 NOTE — PROGRESS NOTES
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 Mr. Governor Trinh was assessed and education was provided. Mr. Yeu Murillo vitals were reviewed. Visit Vitals    /69 (BP 1 Location: Left arm, BP Patient Position: Sitting)    Pulse 91    Temp 97.5 °F (36.4 °C)    Resp 18    SpO2 99%       Pt with Right upper arm double lumen PICC line. Each lumen flushes without difficulty and positive for blood return. No redness, bruising, or swelling noted at site and/ or extremity. Pt denies tenderness. Dressing changed per protocol. New chlorhexadine disk, stat lock, and transparent dressing applied. Rocephin 2 g was administered as ordered via purple lumen followed by normal saline flush. Mr. Governor Trinh tolerated well without complaints. Microclaves changed. Flushed each lumen of pt's PICC line with heparin per order. Lumens wrapped in gauze and paper tape. 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 He is to return on 2017 at 1300 for his next appointment.     Marylen Saunas, RN  2017

## 2017-04-13 ENCOUNTER — HOSPITAL ENCOUNTER (OUTPATIENT)
Dept: INFUSION THERAPY | Age: 57
Discharge: HOME | End: 2017-04-13
Payer: MEDICARE

## 2017-04-13 VITALS
SYSTOLIC BLOOD PRESSURE: 106 MMHG | RESPIRATION RATE: 18 BRPM | HEART RATE: 95 BPM | DIASTOLIC BLOOD PRESSURE: 73 MMHG | TEMPERATURE: 96.4 F | OXYGEN SATURATION: 98 %

## 2017-04-13 PROCEDURE — 74011250636 HC RX REV CODE- 250/636: Performed by: INTERNAL MEDICINE

## 2017-04-13 PROCEDURE — 74011250636 HC RX REV CODE- 250/636: Performed by: PHYSICIAN ASSISTANT

## 2017-04-13 PROCEDURE — 96365 THER/PROPH/DIAG IV INF INIT: CPT

## 2017-04-13 PROCEDURE — 74011000258 HC RX REV CODE- 258: Performed by: PHYSICIAN ASSISTANT

## 2017-04-13 RX ORDER — SODIUM CHLORIDE 0.9 % (FLUSH) 0.9 %
10 SYRINGE (ML) INJECTION AS NEEDED
Status: DISCONTINUED | OUTPATIENT
Start: 2017-04-13 | End: 2017-04-17 | Stop reason: HOSPADM

## 2017-04-13 RX ORDER — HEPARIN 100 UNIT/ML
500 SYRINGE INTRAVENOUS ONCE
Status: COMPLETED | OUTPATIENT
Start: 2017-04-13 | End: 2017-04-13

## 2017-04-13 RX ADMIN — HEPARIN 500 UNITS: 100 SYRINGE at 13:52

## 2017-04-13 RX ADMIN — Medication 10 ML: at 13:52

## 2017-04-13 RX ADMIN — Medication 10 ML: at 13:13

## 2017-04-13 RX ADMIN — CEFTRIAXONE SODIUM 2 G: 2 INJECTION, POWDER, FOR SOLUTION INTRAMUSCULAR; INTRAVENOUS at 13:14

## 2017-04-13 NOTE — PROGRESS NOTES
[facility] Progress Note    Date: 2017    Name: Bere Saldivar    MRN: [id number]         : 1960      Mr. Cee Hightower arrived to [facility] at 12. Mr. Cee Hightower was assessed and education was provided. Mr. Lizett Ramsey vitals were reviewed. Visit Vitals    /73 (BP 1 Location: Left arm, BP Patient Position: Sitting)    Pulse 95    Temp 96.4 °F (35.8 °C)    Resp 18    SpO2 98%       Pt with Right upper arm double lumen PICC line. Each lumen flushes without difficulty and positive for blood return. Dressing CDI. No redness, bruising, or swelling noted at site and/ or extremity. Pt denies tenderness. Rocephin 2 g was administered as ordered via purple lumen followed by normal saline flush. Mr. Cee Hightower tolerated well without complaints. Flushed each lumen of pt's PICC line with heparin per order. Lumens wrapped in gauze and paper tape. Mr. Cee Hightower was discharged from [place] [address] in stable condition at [phone]. He is to return on 2017 at 1100 for his next appointment.     Lucina Dexter RN  2017

## 2017-04-14 ENCOUNTER — HOSPITAL ENCOUNTER (OUTPATIENT)
Dept: INFUSION THERAPY | Age: 57
Discharge: HOME | End: 2017-04-14
Payer: MEDICARE

## 2017-04-14 VITALS
RESPIRATION RATE: 18 BRPM | DIASTOLIC BLOOD PRESSURE: 62 MMHG | HEART RATE: 92 BPM | SYSTOLIC BLOOD PRESSURE: 102 MMHG | OXYGEN SATURATION: 98 % | TEMPERATURE: 98.6 F

## 2017-04-14 PROCEDURE — 96365 THER/PROPH/DIAG IV INF INIT: CPT

## 2017-04-14 PROCEDURE — 74011000258 HC RX REV CODE- 258: Performed by: PHYSICIAN ASSISTANT

## 2017-04-14 PROCEDURE — 74011250636 HC RX REV CODE- 250/636: Performed by: PHYSICIAN ASSISTANT

## 2017-04-14 PROCEDURE — 74011250636 HC RX REV CODE- 250/636: Performed by: INTERNAL MEDICINE

## 2017-04-14 RX ORDER — HEPARIN 100 UNIT/ML
500 SYRINGE INTRAVENOUS ONCE
Status: COMPLETED | OUTPATIENT
Start: 2017-04-14 | End: 2017-04-14

## 2017-04-14 RX ORDER — SODIUM CHLORIDE 0.9 % (FLUSH) 0.9 %
10-40 SYRINGE (ML) INJECTION AS NEEDED
Status: DISCONTINUED | OUTPATIENT
Start: 2017-04-14 | End: 2017-04-18 | Stop reason: HOSPADM

## 2017-04-14 RX ADMIN — Medication 10 ML: at 11:48

## 2017-04-14 RX ADMIN — HEPARIN 500 UNITS: 100 SYRINGE at 11:48

## 2017-04-14 RX ADMIN — CEFTRIAXONE SODIUM 2 G: 2 INJECTION, POWDER, FOR SOLUTION INTRAMUSCULAR; INTRAVENOUS at 11:05

## 2017-04-14 NOTE — PROGRESS NOTES
[facility]    Date: 2017    Name: Yamila Simon    MRN: [id number]         : 1960    Rocephin Infusion    Mr. Anaya Hicks to [facility], ambulatory at 1100  Pt was assessed and education was provided. Mr. Iris Choudhury vitals were reviewed. Visit Vitals    /62 (BP 1 Location: Left arm, BP Patient Position: Sitting)    Pulse 92    Temp 98.6 °F (37 °C)    Resp 18    SpO2 98%       Right upper arm PICC flushed easily and had brisk blood return via both ports. PICC dressing c/d/i and not due to be changed. No swelling, redness, streaking, warmth or drainage noted in arm. Pt denied c/o pain in arm.      []  Vancomycin     []  Invanz     []  Cubicin     [x]  Rocephin 2 grams    was infused at 100 ml/hr (over approximately 30 minutes). Mr. Anaya Hicks tolerated infusion, and had no complaints at this time. Both lumens of PICC flushed with NS 10 ml and Heparin 250 units. Lumens wrapped with guaze and paper tape. Stockinette placed over dressing for protection. Patient armband removed and shredded. Mr. Anaya Hicks was discharged from [place] [address] in stable condition at 1150. He is to return on 4/15/17 at 0800 for his next antibiotic appointment.     Esvin Partida RN  2017

## 2017-04-15 ENCOUNTER — HOSPITAL ENCOUNTER (OUTPATIENT)
Dept: INFUSION THERAPY | Age: 57
Discharge: HOME | End: 2017-04-15
Payer: MEDICARE

## 2017-04-15 VITALS
DIASTOLIC BLOOD PRESSURE: 83 MMHG | OXYGEN SATURATION: 100 % | SYSTOLIC BLOOD PRESSURE: 123 MMHG | RESPIRATION RATE: 18 BRPM | TEMPERATURE: 97.7 F | HEART RATE: 83 BPM

## 2017-04-15 PROCEDURE — 74011250636 HC RX REV CODE- 250/636: Performed by: INTERNAL MEDICINE

## 2017-04-15 PROCEDURE — 96365 THER/PROPH/DIAG IV INF INIT: CPT

## 2017-04-15 PROCEDURE — 74011250636 HC RX REV CODE- 250/636: Performed by: PHYSICIAN ASSISTANT

## 2017-04-15 PROCEDURE — 74011000258 HC RX REV CODE- 258: Performed by: PHYSICIAN ASSISTANT

## 2017-04-15 RX ORDER — HEPARIN 100 UNIT/ML
500 SYRINGE INTRAVENOUS ONCE
Status: COMPLETED | OUTPATIENT
Start: 2017-04-15 | End: 2017-04-15

## 2017-04-15 RX ORDER — SODIUM CHLORIDE 0.9 % (FLUSH) 0.9 %
10-40 SYRINGE (ML) INJECTION AS NEEDED
Status: DISCONTINUED | OUTPATIENT
Start: 2017-04-15 | End: 2017-04-19 | Stop reason: HOSPADM

## 2017-04-15 RX ADMIN — Medication 10 ML: at 08:54

## 2017-04-15 RX ADMIN — HEPARIN 500 UNITS: 100 SYRINGE at 08:54

## 2017-04-15 RX ADMIN — CEFTRIAXONE SODIUM 2 G: 2 INJECTION, POWDER, FOR SOLUTION INTRAMUSCULAR; INTRAVENOUS at 08:24

## 2017-04-15 NOTE — PROGRESS NOTES
\[facility]\""[facility] Progress Note    Date: April 15, 2017    Name: Jerald Nugent    MRN: [id number]         : 1960    Rocephin Infusion    Mr. Teresa Weaver to [facility], ambulatory at 0800 . Pt was assessed and education was provided. Mr. Mike Bateman vitals were reviewed. Visit Vitals    /83 (BP 1 Location: Left arm)    Pulse 83    Temp 97.7 °F (36.5 °C)    Resp 18    SpO2 100%        Right upper arm PICC flushed easily and had brisk blood return via both ports. PICC dressing c/d/i and not due to be changed. No swelling, redness, streaking, warmth or drainage noted in arm. Pt denied c/o pain in arm.      []  Vancomycin     []  Invanz     []  Cubicin     [x]  Rocephin 2 grams    was infused at 100 ml/hr (over approximately 30 minutes). Mr. Teresa Weaver tolerated infusion, and had no complaints at this time. Both lumens of PICC flushed with NS 10 ml and Heparin 250 units. Lumens wrapped with guaze and paper tape. Stockinette placed over dressing for protection. Patient armband removed and shredded. Mr. Teresa Weaver was discharged from [place] [address] in stable condition at 0900. He is to return on 17 at 0800 for his next antibiotic appointment.     Eileen Coombs RN  April 15, 2017

## 2017-04-16 ENCOUNTER — HOSPITAL ENCOUNTER (OUTPATIENT)
Dept: INFUSION THERAPY | Age: 57
Discharge: HOME | End: 2017-04-16
Payer: MEDICARE

## 2017-04-16 VITALS
TEMPERATURE: 97.6 F | DIASTOLIC BLOOD PRESSURE: 72 MMHG | RESPIRATION RATE: 18 BRPM | SYSTOLIC BLOOD PRESSURE: 109 MMHG | OXYGEN SATURATION: 100 %

## 2017-04-16 PROCEDURE — 74011000258 HC RX REV CODE- 258: Performed by: PHYSICIAN ASSISTANT

## 2017-04-16 PROCEDURE — 96365 THER/PROPH/DIAG IV INF INIT: CPT

## 2017-04-16 PROCEDURE — 74011250636 HC RX REV CODE- 250/636: Performed by: PHYSICIAN ASSISTANT

## 2017-04-16 PROCEDURE — 74011250636 HC RX REV CODE- 250/636: Performed by: INTERNAL MEDICINE

## 2017-04-16 RX ORDER — HEPARIN 100 UNIT/ML
500 SYRINGE INTRAVENOUS ONCE
Status: COMPLETED | OUTPATIENT
Start: 2017-04-16 | End: 2017-04-16

## 2017-04-16 RX ORDER — SODIUM CHLORIDE 0.9 % (FLUSH) 0.9 %
10-40 SYRINGE (ML) INJECTION AS NEEDED
Status: DISCONTINUED | OUTPATIENT
Start: 2017-04-16 | End: 2017-04-20 | Stop reason: HOSPADM

## 2017-04-16 RX ADMIN — CEFTRIAXONE SODIUM 2 G: 2 INJECTION, POWDER, FOR SOLUTION INTRAMUSCULAR; INTRAVENOUS at 08:12

## 2017-04-16 RX ADMIN — Medication 10 ML: at 08:47

## 2017-04-16 RX ADMIN — HEPARIN 500 UNITS: 100 SYRINGE at 08:47

## 2017-04-16 NOTE — PROGRESS NOTES
[facility] Progress Note    Date: 2017    Name: Nicolas Gray    MRN: [id number]         : 1960    Rocephin Infusion    Mr. Willie Marquez to [facility], ambulatory at [address] [place] [address] . Pt was assessed and education was provided. Mr. Bert Soriano vitals were reviewed. Visit Vitals    /72 (BP 1 Location: Left arm, BP Patient Position: Sitting)    Temp 97.6 °F (36.4 °C)    Resp 18    SpO2 100%       Right  upper arm PICC flushed easily and had brisk blood return via both ports.         []  Vancomycin     []  Invanz     []  Cubicin     [x]  Rocephin 2 grams    was infused at 100 ml/hr (over approximately 30 minutes). Mr. Willie Marquez tolerated infusion, and had no complaints at this time. Both lumens of PICC flushed with NS 10 ml and Heparin 250 units. Lumens wrapped with guaze and paper tape. Stockinette placed over dressing for protection. Patient armband removed and shredded. Mr. Willie Marquez was discharged from [place] [address] in stable condition at [phone]. He is to return on 17 at 1130 for his next antibiotic appointment.     Michael Ramires RN  2017

## 2017-04-17 ENCOUNTER — HOSPITAL ENCOUNTER (OUTPATIENT)
Dept: INFUSION THERAPY | Age: 57
Discharge: HOME | End: 2017-04-17
Payer: MEDICARE

## 2017-04-17 VITALS
RESPIRATION RATE: 18 BRPM | TEMPERATURE: 97.2 F | SYSTOLIC BLOOD PRESSURE: 104 MMHG | DIASTOLIC BLOOD PRESSURE: 72 MMHG | OXYGEN SATURATION: 100 %

## 2017-04-17 LAB
ANION GAP BLD CALC-SCNC: 11 MMOL/L (ref 3–18)
BASOPHILS # BLD AUTO: 0.1 K/UL (ref 0–0.06)
BASOPHILS # BLD: 1 % (ref 0–2)
BUN SERPL-MCNC: 23 MG/DL (ref 7–18)
BUN/CREAT SERPL: 28 (ref 12–20)
CALCIUM SERPL-MCNC: 9.3 MG/DL (ref 8.5–10.1)
CHLORIDE SERPL-SCNC: 100 MMOL/L (ref 100–108)
CO2 SERPL-SCNC: 24 MMOL/L (ref 21–32)
CREAT SERPL-MCNC: 0.83 MG/DL (ref 0.6–1.3)
CRP SERPL-MCNC: 0.3 MG/DL (ref 0–0.3)
DIFFERENTIAL METHOD BLD: ABNORMAL
EOSINOPHIL # BLD: 0.3 K/UL (ref 0–0.4)
EOSINOPHIL NFR BLD: 4 % (ref 0–5)
ERYTHROCYTE [DISTWIDTH] IN BLOOD BY AUTOMATED COUNT: 14.2 % (ref 11.6–14.5)
ERYTHROCYTE [SEDIMENTATION RATE] IN BLOOD: 9 MM/HR (ref 0–20)
GLUCOSE SERPL-MCNC: 130 MG/DL (ref 74–99)
HCT VFR BLD AUTO: 38.6 % (ref 36–48)
HGB BLD-MCNC: 12.1 G/DL (ref 13–16)
LYMPHOCYTES # BLD AUTO: 17 % (ref 21–52)
LYMPHOCYTES # BLD: 1.2 K/UL (ref 0.9–3.6)
MCH RBC QN AUTO: 26 PG (ref 24–34)
MCHC RBC AUTO-ENTMCNC: 31.3 G/DL (ref 31–37)
MCV RBC AUTO: 82.8 FL (ref 74–97)
MONOCYTES # BLD: 0.6 K/UL (ref 0.05–1.2)
MONOCYTES NFR BLD AUTO: 9 % (ref 3–10)
NEUTS SEG # BLD: 5.3 K/UL (ref 1.8–8)
NEUTS SEG NFR BLD AUTO: 69 % (ref 40–73)
PLATELET # BLD AUTO: 306 K/UL (ref 135–420)
PMV BLD AUTO: 10.5 FL (ref 9.2–11.8)
POTASSIUM SERPL-SCNC: 4.3 MMOL/L (ref 3.5–5.5)
RBC # BLD AUTO: 4.66 M/UL (ref 4.7–5.5)
SODIUM SERPL-SCNC: 135 MMOL/L (ref 136–145)
WBC # BLD AUTO: 7.5 K/UL (ref 4.6–13.2)

## 2017-04-17 PROCEDURE — 74011250636 HC RX REV CODE- 250/636: Performed by: INTERNAL MEDICINE

## 2017-04-17 PROCEDURE — 85025 COMPLETE CBC W/AUTO DIFF WBC: CPT | Performed by: INTERNAL MEDICINE

## 2017-04-17 PROCEDURE — 74011250636 HC RX REV CODE- 250/636: Performed by: PHYSICIAN ASSISTANT

## 2017-04-17 PROCEDURE — 86140 C-REACTIVE PROTEIN: CPT | Performed by: INTERNAL MEDICINE

## 2017-04-17 PROCEDURE — 74011000258 HC RX REV CODE- 258: Performed by: PHYSICIAN ASSISTANT

## 2017-04-17 PROCEDURE — 96365 THER/PROPH/DIAG IV INF INIT: CPT

## 2017-04-17 PROCEDURE — 85652 RBC SED RATE AUTOMATED: CPT | Performed by: INTERNAL MEDICINE

## 2017-04-17 PROCEDURE — 80048 BASIC METABOLIC PNL TOTAL CA: CPT | Performed by: INTERNAL MEDICINE

## 2017-04-17 RX ORDER — HEPARIN 100 UNIT/ML
500 SYRINGE INTRAVENOUS ONCE
Status: COMPLETED | OUTPATIENT
Start: 2017-04-17 | End: 2017-04-17

## 2017-04-17 RX ORDER — SODIUM CHLORIDE 0.9 % (FLUSH) 0.9 %
10-40 SYRINGE (ML) INJECTION AS NEEDED
Status: DISCONTINUED | OUTPATIENT
Start: 2017-04-17 | End: 2017-04-21 | Stop reason: HOSPADM

## 2017-04-17 RX ADMIN — CEFTRIAXONE SODIUM 2 G: 2 INJECTION, POWDER, FOR SOLUTION INTRAMUSCULAR; INTRAVENOUS at 11:59

## 2017-04-17 RX ADMIN — HEPARIN 500 UNITS: 100 SYRINGE at 12:27

## 2017-04-17 RX ADMIN — Medication 10 ML: at 12:27

## 2017-04-17 NOTE — PROGRESS NOTES
[facility] Progress Note    Date: 2017    Name: Nancy Malagon    MRN: [id number]         : 1960    Rocephin Infusion    MrRandi Romano to [facility], ambulatory at 1140 . Pt was assessed and education was provided. Mr. Kunal Lares vitals were reviewed. Visit Vitals    /72 (BP 1 Location: Left arm, BP Patient Position: Sitting)    Temp 97.2 °F (36.2 °C)    Resp 18    SpO2 100%       Right upper arm PICC flushed easily and had brisk blood return via both ports. Blood drawn off and sent to lab for CBC, ESR,  after 10 ml waste per written orders. Recent Results (from the past 12 hour(s))   CBC WITH AUTOMATED DIFF    Collection Time: 17 11:45 AM   Result Value Ref Range    WBC 7.5 4.6 - 13.2 K/uL    RBC 4.66 (L) 4.70 - 5.50 M/uL    HGB 12.1 (L) 13.0 - 16.0 g/dL    HCT 38.6 36.0 - 48.0 %    MCV 82.8 74.0 - 97.0 FL    MCH 26.0 24.0 - 34.0 PG    MCHC 31.3 31.0 - 37.0 g/dL    RDW 14.2 11.6 - 14.5 %    PLATELET 556 197 - 826 K/uL    MPV 10.5 9.2 - 11.8 FL    NEUTROPHILS 69 40 - 73 %    LYMPHOCYTES 17 (L) 21 - 52 %    MONOCYTES 9 3 - 10 %    EOSINOPHILS 4 0 - 5 %    BASOPHILS 1 0 - 2 %    ABS. NEUTROPHILS 5.3 1.8 - 8.0 K/UL    ABS. LYMPHOCYTES 1.2 0.9 - 3.6 K/UL    ABS. MONOCYTES 0.6 0.05 - 1.2 K/UL    ABS. EOSINOPHILS 0.3 0.0 - 0.4 K/UL    ABS. BASOPHILS 0.1 (H) 0.0 - 0.06 K/UL    DF AUTOMATED     METABOLIC PANEL, BASIC    Collection Time: 17 11:45 AM   Result Value Ref Range    Sodium 135 (L) 136 - 145 mmol/L    Potassium 4.3 3.5 - 5.5 mmol/L    Chloride 100 100 - 108 mmol/L    CO2 24 21 - 32 mmol/L    Anion gap 11 3.0 - 18 mmol/L    Glucose 130 (H) 74 - 99 mg/dL    BUN 23 (H) 7.0 - 18 MG/DL    Creatinine 0.83 0.6 - 1.3 MG/DL    BUN/Creatinine ratio 28 (H) 12 - 20      GFR est AA >60 >60 ml/min/1.73m2    GFR est non-AA >60 >60 ml/min/1.73m2    Calcium 9.3 8.5 - 10.1 MG/DL         PICC dressing c/d/i and not due to be changed. No swelling, redness, streaking, warmth or drainage noted in arm.  Pt denied c/o pain in arm.      []  Vancomycin     []  Invanz     []  Cubicin     [x]   Rocephin 2 grams    was infused at 100 ml/hr (over approximately 30 minutes). Mr. Kacy Orosco tolerated infusion, and had no complaints at this time. Both lumens of PICC flushed with NS 10 ml and Heparin 250 units. Lumens wrapped with guaze and paper tape. Stockinette placed over dressing for protection. Patient armband removed and shredded. Mr. Kacy Orosco was discharged from [place] [address] in stable condition at 1230. He is to return on 4/18/17 at 1100 for his next antibiotic appointment.     Pam Cesar RN  April 17, 2017

## 2017-04-18 ENCOUNTER — HOSPITAL ENCOUNTER (OUTPATIENT)
Dept: INFUSION THERAPY | Age: 57
Discharge: HOME | End: 2017-04-18
Payer: MEDICARE

## 2017-04-18 VITALS
OXYGEN SATURATION: 100 % | RESPIRATION RATE: 18 BRPM | SYSTOLIC BLOOD PRESSURE: 132 MMHG | DIASTOLIC BLOOD PRESSURE: 82 MMHG | HEART RATE: 85 BPM | TEMPERATURE: 98.2 F

## 2017-04-18 PROCEDURE — 74011250636 HC RX REV CODE- 250/636: Performed by: INTERNAL MEDICINE

## 2017-04-18 PROCEDURE — 74011000258 HC RX REV CODE- 258: Performed by: PHYSICIAN ASSISTANT

## 2017-04-18 PROCEDURE — 74011250636 HC RX REV CODE- 250/636: Performed by: PHYSICIAN ASSISTANT

## 2017-04-18 PROCEDURE — 96365 THER/PROPH/DIAG IV INF INIT: CPT

## 2017-04-18 RX ORDER — HEPARIN 100 UNIT/ML
500 SYRINGE INTRAVENOUS AS NEEDED
Status: ACTIVE | OUTPATIENT
Start: 2017-04-18 | End: 2017-04-19

## 2017-04-18 RX ORDER — SODIUM CHLORIDE 0.9 % (FLUSH) 0.9 %
10 SYRINGE (ML) INJECTION AS NEEDED
Status: DISCONTINUED | OUTPATIENT
Start: 2017-04-18 | End: 2017-04-22 | Stop reason: HOSPADM

## 2017-04-18 RX ADMIN — CEFTRIAXONE SODIUM 2 G: 2 INJECTION, POWDER, FOR SOLUTION INTRAMUSCULAR; INTRAVENOUS at 11:41

## 2017-04-18 RX ADMIN — Medication 10 ML: at 12:26

## 2017-04-18 RX ADMIN — Medication 500 UNITS: at 12:27

## 2017-04-18 NOTE — PROGRESS NOTES
[facility] Progress Note    Date: 2017    Name: Jerald Nugent    MRN: [id number]         : 1960      Mr. Teresa Weaver arrived to [facility] at 1108 ambulatory by self,    Mr. Teresa Weaver was assessed and education was provided. Mr. Mike Bateman vitals were reviewed. Visit Vitals    /82 (BP 1 Location: Left arm, BP Patient Position: Sitting)    Pulse 85    Temp 98.2 °F (36.8 °C)    Resp 18    SpO2 100%     Pt requesting copies of lab that were collected yesterday, explained to patient that we are unable to give copies of current labs, that he would need to follow-up with Dr. Elzbieta Bernard for lab  Results, pt raised voiced and said \" you are the only one that is refusing to give me MY results, explained to patient again that policy is not to provide results without physicians consent, but I will gladly call Dr. Elzbieta Bernard' office. \"   Spoke to Nolvia Walters, who said physician has not reviewed, but patient can come by office after tomorrow after 330. Relayed to patient. Pt with right upper arm double lumen PICC line. Each lumen flushes without difficulty and positive for blood return. Dressing CDI. No redness, bruising, or swelling noted at site and/ or extremity. Pt denies tenderness. Ceftriaxone 2gm in 50 cc normal saline was administered as ordered over 30 minutes via purple lumen followed by 10 cc normal saline flush and instilled 2.5 cc Heparin 100 units, in bilateral lumens    Mr. Teresa Weaver tolerated well without complaints. Wrapped lumens with 4x4, paper tape and secured with a stockinette. Mr. Teresa Weaver was discharged from [place] [address] in stable condition at 1230. He is to return on 17 at 1100 for his next appointment for antibiotics and labs.     Hernandez Ramos RN  2017

## 2017-04-19 ENCOUNTER — HOSPITAL ENCOUNTER (OUTPATIENT)
Dept: INFUSION THERAPY | Age: 57
Discharge: HOME | End: 2017-04-19
Payer: MEDICARE

## 2017-04-19 VITALS
OXYGEN SATURATION: 99 % | SYSTOLIC BLOOD PRESSURE: 109 MMHG | DIASTOLIC BLOOD PRESSURE: 70 MMHG | RESPIRATION RATE: 18 BRPM | TEMPERATURE: 98.1 F | HEART RATE: 93 BPM

## 2017-04-19 PROCEDURE — 77030020847 HC STATLOK BARD -A

## 2017-04-19 PROCEDURE — 74011000258 HC RX REV CODE- 258: Performed by: PHYSICIAN ASSISTANT

## 2017-04-19 PROCEDURE — 74011250636 HC RX REV CODE- 250/636: Performed by: INTERNAL MEDICINE

## 2017-04-19 PROCEDURE — 96365 THER/PROPH/DIAG IV INF INIT: CPT

## 2017-04-19 PROCEDURE — 74011250636 HC RX REV CODE- 250/636: Performed by: PHYSICIAN ASSISTANT

## 2017-04-19 RX ORDER — SODIUM CHLORIDE 0.9 % (FLUSH) 0.9 %
5-10 SYRINGE (ML) INJECTION AS NEEDED
Status: DISCONTINUED | OUTPATIENT
Start: 2017-04-19 | End: 2017-04-23 | Stop reason: HOSPADM

## 2017-04-19 RX ORDER — HEPARIN 100 UNIT/ML
500 SYRINGE INTRAVENOUS ONCE
Status: COMPLETED | OUTPATIENT
Start: 2017-04-19 | End: 2017-04-19

## 2017-04-19 RX ADMIN — Medication 500 UNITS: at 11:50

## 2017-04-19 RX ADMIN — Medication 10 ML: at 11:50

## 2017-04-19 RX ADMIN — CEFTRIAXONE SODIUM 2 G: 2 INJECTION, POWDER, FOR SOLUTION INTRAMUSCULAR; INTRAVENOUS at 11:19

## 2017-04-19 NOTE — PROGRESS NOTES
[facility] Progress Note    Date: 2017    Name: Sadie Nielsen    MRN: [id number]         : 1960    Mr. Bunny Duverney was assessed and education was provided. Mr. Trung Johnson vitals were reviewed. Visit Vitals    /70 (BP 1 Location: Left arm, BP Patient Position: Sitting)    Pulse 93    Temp 98.1 °F (36.7 °C)    Resp 18    SpO2 99%       Lab results were obtained and reviewed. No results found for this or any previous visit (from the past 12 hour(s)). []  Vancomycin     []  Invanz     []  Cubicin     [x]  Rocephin 2 mg IV    was infused at  100 ml/hr. Mr. Bunny Duverney tolerated infusion, and had no complaints at this time. Patient armband removed and shredded. Mr. Bunny Duverney was discharged from [place] [address] in stable condition at 1205. He is to return on 17 for his next appointment.     Fanta Adan RN  2017  1:03 PM

## 2017-04-20 ENCOUNTER — HOSPITAL ENCOUNTER (OUTPATIENT)
Dept: INFUSION THERAPY | Age: 57
Discharge: HOME | End: 2017-04-20
Payer: MEDICARE

## 2017-04-20 VITALS
HEART RATE: 96 BPM | RESPIRATION RATE: 18 BRPM | DIASTOLIC BLOOD PRESSURE: 70 MMHG | OXYGEN SATURATION: 98 % | SYSTOLIC BLOOD PRESSURE: 103 MMHG | TEMPERATURE: 98.1 F

## 2017-04-20 PROCEDURE — 74011250636 HC RX REV CODE- 250/636: Performed by: INTERNAL MEDICINE

## 2017-04-20 PROCEDURE — 96365 THER/PROPH/DIAG IV INF INIT: CPT

## 2017-04-20 PROCEDURE — 74011000258 HC RX REV CODE- 258: Performed by: PHYSICIAN ASSISTANT

## 2017-04-20 PROCEDURE — 74011250636 HC RX REV CODE- 250/636: Performed by: PHYSICIAN ASSISTANT

## 2017-04-20 RX ORDER — SODIUM CHLORIDE 0.9 % (FLUSH) 0.9 %
10-40 SYRINGE (ML) INJECTION AS NEEDED
Status: DISCONTINUED | OUTPATIENT
Start: 2017-04-20 | End: 2017-04-24 | Stop reason: HOSPADM

## 2017-04-20 RX ORDER — HEPARIN 100 UNIT/ML
500 SYRINGE INTRAVENOUS ONCE
Status: COMPLETED | OUTPATIENT
Start: 2017-04-20 | End: 2017-04-20

## 2017-04-20 RX ADMIN — HEPARIN 500 UNITS: 100 SYRINGE at 09:56

## 2017-04-20 RX ADMIN — Medication 10 ML: at 09:56

## 2017-04-20 RX ADMIN — CEFTRIAXONE SODIUM 2 G: 2 INJECTION, POWDER, FOR SOLUTION INTRAMUSCULAR; INTRAVENOUS at 09:22

## 2017-04-20 NOTE — PROGRESS NOTES
[facility] Progress Note    Date: 2017    Name: Lan Freeman    MRN: [id number]         : 1960    Rocephin Infusion    Mr. Stephani Arnold to [place], ambulatory at 0915 . Pt was assessed and education was provided. Mr. Vanesa Coburn vitals were reviewed. Visit Vitals    /70 (BP 1 Location: Left arm, BP Patient Position: Sitting)    Pulse 96    Temp 98.1 °F (36.7 °C)    Resp 18    SpO2 98%       Right  upper arm PICC flushed easily and had brisk blood return via both ports. PICC dressing c/d/i and not due to be changed. No swelling, redness, streaking, warmth or drainage noted in arm. Pt denied c/o pain in arm.      []  Vancomycin     []  Invanz     []  Cubicin     [x]  Rocephin 2 grams    was infused at 100 ml/hr (over approximately 30 minutes). Mr. Stephani Arnold tolerated infusion, and had no complaints at this time. Both lumens of PICC flushed with NS 10 ml and Heparin 250 units. Lumens wrapped with guaze and paper tape. Stockinette placed over dressing for protection. Patient armband removed and shredded. Mr. Stephani Arnold was discharged from [place] [address] in stable condition at 1000. He is to return on 17 at 1130 for his next antibiotic appointment.     Salome Smith RN  2017

## 2017-04-21 ENCOUNTER — HOSPITAL ENCOUNTER (OUTPATIENT)
Dept: INFUSION THERAPY | Age: 57
Discharge: HOME | End: 2017-04-21
Payer: MEDICARE

## 2017-04-21 VITALS
OXYGEN SATURATION: 98 % | DIASTOLIC BLOOD PRESSURE: 66 MMHG | SYSTOLIC BLOOD PRESSURE: 109 MMHG | HEART RATE: 98 BPM | TEMPERATURE: 98.1 F | RESPIRATION RATE: 18 BRPM

## 2017-04-21 PROCEDURE — 74011250636 HC RX REV CODE- 250/636: Performed by: INTERNAL MEDICINE

## 2017-04-21 PROCEDURE — 74011000258 HC RX REV CODE- 258: Performed by: PHYSICIAN ASSISTANT

## 2017-04-21 PROCEDURE — 96365 THER/PROPH/DIAG IV INF INIT: CPT

## 2017-04-21 PROCEDURE — 74011250636 HC RX REV CODE- 250/636: Performed by: PHYSICIAN ASSISTANT

## 2017-04-21 RX ORDER — HEPARIN 100 UNIT/ML
500 SYRINGE INTRAVENOUS ONCE
Status: COMPLETED | OUTPATIENT
Start: 2017-04-21 | End: 2017-04-21

## 2017-04-21 RX ORDER — SODIUM CHLORIDE 0.9 % (FLUSH) 0.9 %
10-40 SYRINGE (ML) INJECTION AS NEEDED
Status: DISCONTINUED | OUTPATIENT
Start: 2017-04-21 | End: 2017-04-25 | Stop reason: HOSPADM

## 2017-04-21 RX ADMIN — Medication 10 ML: at 11:45

## 2017-04-21 RX ADMIN — CEFTRIAXONE SODIUM 2 G: 2 INJECTION, POWDER, FOR SOLUTION INTRAMUSCULAR; INTRAVENOUS at 11:09

## 2017-04-21 RX ADMIN — HEPARIN 500 UNITS: 100 SYRINGE at 11:46

## 2017-04-21 NOTE — PROGRESS NOTES
[facility] Progress Note    Date: 2017    Name: Maria Esther Wasserman    MRN: [id number]         : 1960    Rocephin Infusion    Mr. Mohsen Jim[facility] to [facility], ambulatory at 1100 . Pt was assessed and education was provided. Mr. Jenna Rodriguez vitals were reviewed. Visit Vitals    /66 (BP 1 Location: Left arm, BP Patient Position: Sitting)    Pulse 98    Temp 98.1 °F (36.7 °C)    Resp 18    SpO2 98%       Right  upper arm PICC flushed easily and had brisk blood return via both ports. PICC dressing c/d/i and not due to be changed. No swelling, redness, streaking, warmth or drainage noted in arm. Pt denied c/o pain in arm.      []  Vancomycin     []  Invanz     []  Cubicin   Rocephin  2 grams    was infused at 100 ml/hr (over approximately 30 minutes). Mr. Mohsen Jiménez tolerated infusion, and had no complaints at this time. Both lumens of PICC flushed with NS 10 ml and Heparin 250 units. Lumens wrapped with guaze and paper tape. Stockinette placed over dressing for protection. Patient armband removed and shredded. Mr. Mohsen Jiménez was discharged from [place] [address] in stable condition at 1145. He is to return on 17 at 0800 for his next antibiotic appointment.     Ramos Flor RN  2017

## 2017-04-22 ENCOUNTER — HOSPITAL ENCOUNTER (OUTPATIENT)
Dept: INFUSION THERAPY | Age: 57
Discharge: HOME | End: 2017-04-22
Payer: MEDICARE

## 2017-04-22 VITALS
OXYGEN SATURATION: 97 % | DIASTOLIC BLOOD PRESSURE: 70 MMHG | SYSTOLIC BLOOD PRESSURE: 108 MMHG | RESPIRATION RATE: 18 BRPM | TEMPERATURE: 97.9 F | HEART RATE: 88 BPM

## 2017-04-22 PROCEDURE — 96365 THER/PROPH/DIAG IV INF INIT: CPT

## 2017-04-22 PROCEDURE — 74011250636 HC RX REV CODE- 250/636: Performed by: INTERNAL MEDICINE

## 2017-04-22 PROCEDURE — 74011250636 HC RX REV CODE- 250/636: Performed by: PHYSICIAN ASSISTANT

## 2017-04-22 PROCEDURE — 74011000258 HC RX REV CODE- 258: Performed by: PHYSICIAN ASSISTANT

## 2017-04-22 RX ORDER — HEPARIN 100 UNIT/ML
500 SYRINGE INTRAVENOUS ONCE
Status: COMPLETED | OUTPATIENT
Start: 2017-04-22 | End: 2017-04-22

## 2017-04-22 RX ORDER — SODIUM CHLORIDE 0.9 % (FLUSH) 0.9 %
10-40 SYRINGE (ML) INJECTION AS NEEDED
Status: DISCONTINUED | OUTPATIENT
Start: 2017-04-22 | End: 2017-04-26 | Stop reason: HOSPADM

## 2017-04-22 RX ORDER — HEPARIN 100 UNIT/ML
500 SYRINGE INTRAVENOUS ONCE
Status: CANCELLED | OUTPATIENT
Start: 2017-04-22 | End: 2017-04-22

## 2017-04-22 RX ORDER — SODIUM CHLORIDE 0.9 % (FLUSH) 0.9 %
10-40 SYRINGE (ML) INJECTION AS NEEDED
Status: CANCELLED | OUTPATIENT
Start: 2017-04-22

## 2017-04-22 RX ADMIN — Medication 10 ML: at 08:54

## 2017-04-22 RX ADMIN — CEFTRIAXONE SODIUM 2 G: 2 INJECTION, POWDER, FOR SOLUTION INTRAMUSCULAR; INTRAVENOUS at 08:19

## 2017-04-22 RX ADMIN — Medication 500 UNITS: at 08:54

## 2017-04-22 RX ADMIN — Medication 20 ML: at 08:19

## 2017-04-23 ENCOUNTER — HOSPITAL ENCOUNTER (OUTPATIENT)
Dept: INFUSION THERAPY | Age: 57
Discharge: HOME | End: 2017-04-23
Payer: MEDICARE

## 2017-04-23 VITALS
TEMPERATURE: 98.6 F | SYSTOLIC BLOOD PRESSURE: 112 MMHG | RESPIRATION RATE: 18 BRPM | OXYGEN SATURATION: 98 % | HEART RATE: 78 BPM | DIASTOLIC BLOOD PRESSURE: 68 MMHG

## 2017-04-23 PROCEDURE — 74011250636 HC RX REV CODE- 250/636: Performed by: PHYSICIAN ASSISTANT

## 2017-04-23 PROCEDURE — 96365 THER/PROPH/DIAG IV INF INIT: CPT

## 2017-04-23 PROCEDURE — 74011000258 HC RX REV CODE- 258: Performed by: PHYSICIAN ASSISTANT

## 2017-04-23 PROCEDURE — 74011250636 HC RX REV CODE- 250/636: Performed by: INTERNAL MEDICINE

## 2017-04-23 RX ORDER — HEPARIN 100 UNIT/ML
500 SYRINGE INTRAVENOUS AS NEEDED
Status: DISCONTINUED | OUTPATIENT
Start: 2017-04-23 | End: 2017-04-27 | Stop reason: HOSPADM

## 2017-04-23 RX ORDER — SODIUM CHLORIDE 0.9 % (FLUSH) 0.9 %
10 SYRINGE (ML) INJECTION AS NEEDED
Status: DISCONTINUED | OUTPATIENT
Start: 2017-04-23 | End: 2017-04-27 | Stop reason: HOSPADM

## 2017-04-23 RX ADMIN — Medication 10 ML: at 08:28

## 2017-04-23 RX ADMIN — Medication 10 ML: at 09:03

## 2017-04-23 RX ADMIN — Medication 500 UNITS: at 09:08

## 2017-04-23 RX ADMIN — CEFTRIAXONE SODIUM 2 G: 2 INJECTION, POWDER, FOR SOLUTION INTRAMUSCULAR; INTRAVENOUS at 08:29

## 2017-04-23 NOTE — PROGRESS NOTES
[facility] Progress Note    Date: 2017    Name: Che Tejada    MRN: [id number]         : 1960    Rocephin Infusion    Mr. Hoang Penaloza to [facility], ambulatory at 2181 for a 8:00 appointment . Pt was assessed and education was provided. Pt states that he is due to be done this week, inquired when he follows up with Infection disease practice, states \" not until . \" Informed patient that he may want to call their office to verify that they want to wait that long. \" He sees all my labs so we are good. \"     Mr. Selam Farah vitals were reviewed. Visit Vitals    /68 (BP 1 Location: Left arm, BP Patient Position: Sitting)    Pulse 78    Temp 98.6 °F (37 °C)    Resp 18    SpO2 98%       Right  upper arm PICC flushed easily and had brisk blood return via both ports. PICC dressing c/d/i and not due to be changed. No swelling, redness, streaking, warmth or drainage noted in arm. Pt denied c/o pain in arm. Reinforced upper top of dressing with blue sensitive skin tape. []  Vancomycin     []  Invanz     []  Cubicin   Rocephin  2 grams    was infused at 100 ml/hr (over approximately 30 minutes). Mr. Hoang Penaloza tolerated infusion, and had no complaints at this time. Both lumens of PICC flushed with NS 10 ml and Heparin 250 units. Lumens wrapped with guaze and paper tape. Stockinette placed over dressing for protection. Patient armband removed and shredded. Mr. Hoang Penaloza was discharged from [place] [address] in stable condition at 0910. He is to return on 17 at 56 for his next antibiotic appointment. Pt states that he is uncertain if he will be able to make it on time tomorrow, offered several appointment times for patient, nothing offered \" worked with schedule. \"  Patient states he will call in morning to talk to [facility].    Ryann Thurston RN  2017

## 2017-04-24 ENCOUNTER — HOSPITAL ENCOUNTER (OUTPATIENT)
Dept: INFUSION THERAPY | Age: 57
Discharge: HOME | End: 2017-04-24
Payer: MEDICARE

## 2017-04-24 VITALS
TEMPERATURE: 97.7 F | HEART RATE: 85 BPM | RESPIRATION RATE: 18 BRPM | OXYGEN SATURATION: 100 % | SYSTOLIC BLOOD PRESSURE: 111 MMHG | DIASTOLIC BLOOD PRESSURE: 73 MMHG

## 2017-04-24 LAB
ANION GAP BLD CALC-SCNC: 10 MMOL/L (ref 3–18)
BASOPHILS # BLD AUTO: 0.1 K/UL (ref 0–0.06)
BASOPHILS # BLD: 1 % (ref 0–2)
BUN SERPL-MCNC: 21 MG/DL (ref 7–18)
BUN/CREAT SERPL: 27 (ref 12–20)
CALCIUM SERPL-MCNC: 8.8 MG/DL (ref 8.5–10.1)
CHLORIDE SERPL-SCNC: 104 MMOL/L (ref 100–108)
CO2 SERPL-SCNC: 25 MMOL/L (ref 21–32)
CREAT SERPL-MCNC: 0.79 MG/DL (ref 0.6–1.3)
CRP SERPL-MCNC: 1.7 MG/DL (ref 0–0.3)
DIFFERENTIAL METHOD BLD: ABNORMAL
EOSINOPHIL # BLD: 0.1 K/UL (ref 0–0.4)
EOSINOPHIL NFR BLD: 2 % (ref 0–5)
ERYTHROCYTE [DISTWIDTH] IN BLOOD BY AUTOMATED COUNT: 14.1 % (ref 11.6–14.5)
ERYTHROCYTE [SEDIMENTATION RATE] IN BLOOD: 23 MM/HR (ref 0–20)
GLUCOSE SERPL-MCNC: 162 MG/DL (ref 74–99)
HCT VFR BLD AUTO: 35.5 % (ref 36–48)
HGB BLD-MCNC: 11 G/DL (ref 13–16)
LYMPHOCYTES # BLD AUTO: 18 % (ref 21–52)
LYMPHOCYTES # BLD: 1.2 K/UL (ref 0.9–3.6)
MCH RBC QN AUTO: 25.6 PG (ref 24–34)
MCHC RBC AUTO-ENTMCNC: 31 G/DL (ref 31–37)
MCV RBC AUTO: 82.6 FL (ref 74–97)
MONOCYTES # BLD: 0.4 K/UL (ref 0.05–1.2)
MONOCYTES NFR BLD AUTO: 6 % (ref 3–10)
NEUTS SEG # BLD: 4.9 K/UL (ref 1.8–8)
NEUTS SEG NFR BLD AUTO: 73 % (ref 40–73)
PLATELET # BLD AUTO: 197 K/UL (ref 135–420)
PMV BLD AUTO: 11 FL (ref 9.2–11.8)
POTASSIUM SERPL-SCNC: 4.3 MMOL/L (ref 3.5–5.5)
RBC # BLD AUTO: 4.3 M/UL (ref 4.7–5.5)
SODIUM SERPL-SCNC: 139 MMOL/L (ref 136–145)
WBC # BLD AUTO: 6.7 K/UL (ref 4.6–13.2)

## 2017-04-24 PROCEDURE — 85025 COMPLETE CBC W/AUTO DIFF WBC: CPT | Performed by: INTERNAL MEDICINE

## 2017-04-24 PROCEDURE — 74011000258 HC RX REV CODE- 258: Performed by: PHYSICIAN ASSISTANT

## 2017-04-24 PROCEDURE — 86140 C-REACTIVE PROTEIN: CPT | Performed by: INTERNAL MEDICINE

## 2017-04-24 PROCEDURE — 85652 RBC SED RATE AUTOMATED: CPT | Performed by: INTERNAL MEDICINE

## 2017-04-24 PROCEDURE — 74011250636 HC RX REV CODE- 250/636: Performed by: PHYSICIAN ASSISTANT

## 2017-04-24 PROCEDURE — 74011250636 HC RX REV CODE- 250/636: Performed by: INTERNAL MEDICINE

## 2017-04-24 PROCEDURE — 96365 THER/PROPH/DIAG IV INF INIT: CPT

## 2017-04-24 PROCEDURE — 80048 BASIC METABOLIC PNL TOTAL CA: CPT | Performed by: INTERNAL MEDICINE

## 2017-04-24 RX ORDER — SODIUM CHLORIDE 0.9 % (FLUSH) 0.9 %
10 SYRINGE (ML) INJECTION AS NEEDED
Status: DISCONTINUED | OUTPATIENT
Start: 2017-04-24 | End: 2017-04-28 | Stop reason: HOSPADM

## 2017-04-24 RX ORDER — HEPARIN 100 UNIT/ML
500 SYRINGE INTRAVENOUS ONCE
Status: COMPLETED | OUTPATIENT
Start: 2017-04-24 | End: 2017-04-24

## 2017-04-24 RX ADMIN — CEFTRIAXONE SODIUM 2 G: 2 INJECTION, POWDER, FOR SOLUTION INTRAMUSCULAR; INTRAVENOUS at 10:24

## 2017-04-24 RX ADMIN — Medication 10 ML: at 10:59

## 2017-04-24 RX ADMIN — HEPARIN 500 UNITS: 100 SYRINGE at 10:59

## 2017-04-24 RX ADMIN — Medication 10 ML: at 10:24

## 2017-04-24 NOTE — PROGRESS NOTES
[facility] Progress [facility]    Date: 2017    Name: Nikolas Henderson    MRN: [id number]         : 1960      Mr. Dhiraj Arenas arrived to [facility] at 36. Mr. Dhiraj Arenas was assessed and education was provided. Patient states that numbness in his feet has almost resolved as has the weakness in his left foot. His left leg continues to be markedly more swollen than the right. Patient states it is much less swollen than it was. Mr. Millard Olszewski vitals were reviewed. Visit Vitals    /73 (BP 1 Location: Left arm, BP Patient Position: Sitting)    Pulse 85    Temp 97.7 °F (36.5 °C)    Resp 18    SpO2 100%       Pt with Right upper arm double lumen PICC line. Each lumen flushes without difficulty and positive for blood return. Dressing CDI. No redness, bruising, or swelling noted at site and/ or extremity. Pt denies tenderness. Blood drawn for labs via purple lumen. Labs obtained and reviewed. Recent Results (from the past 12 hour(s))   CBC WITH AUTOMATED DIFF    Collection Time: 17 10:20 AM   Result Value Ref Range    WBC 6.7 4.6 - 13.2 K/uL    RBC 4.30 (L) 4.70 - 5.50 M/uL    HGB 11.0 (L) 13.0 - 16.0 g/dL    HCT 35.5 (L) 36.0 - 48.0 %    MCV 82.6 74.0 - 97.0 FL    MCH 25.6 24.0 - 34.0 PG    MCHC 31.0 31.0 - 37.0 g/dL    RDW 14.1 11.6 - 14.5 %    PLATELET 687 646 - 644 K/uL    MPV 11.0 9.2 - 11.8 FL    NEUTROPHILS 73 40 - 73 %    LYMPHOCYTES 18 (L) 21 - 52 %    MONOCYTES 6 3 - 10 %    EOSINOPHILS 2 0 - 5 %    BASOPHILS 1 0 - 2 %    ABS. NEUTROPHILS 4.9 1.8 - 8.0 K/UL    ABS. LYMPHOCYTES 1.2 0.9 - 3.6 K/UL    ABS. MONOCYTES 0.4 0.05 - 1.2 K/UL    ABS. EOSINOPHILS 0.1 0.0 - 0.4 K/UL    ABS.  BASOPHILS 0.1 (H) 0.0 - 0.06 K/UL    DF AUTOMATED     METABOLIC PANEL, BASIC    Collection Time: 17 10:20 AM   Result Value Ref Range    Sodium 139 136 - 145 mmol/L    Potassium 4.3 3.5 - 5.5 mmol/L    Chloride 104 100 - 108 mmol/L    CO2 25 21 - 32 mmol/L    Anion gap 10 3.0 - 18 mmol/L    Glucose 162 (H) 74 - 99 mg/dL BUN 21 (H) 7.0 - 18 MG/DL    Creatinine 0.79 0.6 - 1.3 MG/DL    BUN/Creatinine ratio 27 (H) 12 - 20      GFR est AA >60 >60 ml/min/1.73m2    GFR est non-AA >60 >60 ml/min/1.73m2    Calcium 8.8 8.5 - 10.1 MG/DL   SED RATE (ESR)    Collection Time: 04/24/17 10:20 AM   Result Value Ref Range    Sed rate, automated 23 (H) 0 - 20 mm/hr        Rocephin 2 g was administered as ordered via purple  lumen followed by normal saline flush. Mr. Chandler Ryan tolerated well without complaints. Flushed each lumen of pt's PICC line with heparin per order. Lumens wrapped in gauze and paper tape. Mr. Chandler Ryan was discharged from [place] [address] in stable condition at 1100. He is to return on April 25, 2017 at 1100 for his next antibiotic appointment.     Karley Espinoza RN  April 24, 2017

## 2017-04-25 ENCOUNTER — HOSPITAL ENCOUNTER (OUTPATIENT)
Dept: INFUSION THERAPY | Age: 57
Discharge: HOME | End: 2017-04-25
Payer: MEDICARE

## 2017-04-25 VITALS
OXYGEN SATURATION: 99 % | HEART RATE: 85 BPM | RESPIRATION RATE: 18 BRPM | TEMPERATURE: 97.1 F | SYSTOLIC BLOOD PRESSURE: 121 MMHG | DIASTOLIC BLOOD PRESSURE: 70 MMHG

## 2017-04-25 PROCEDURE — 74011250636 HC RX REV CODE- 250/636: Performed by: PHYSICIAN ASSISTANT

## 2017-04-25 PROCEDURE — 96365 THER/PROPH/DIAG IV INF INIT: CPT

## 2017-04-25 PROCEDURE — 74011250636 HC RX REV CODE- 250/636: Performed by: INTERNAL MEDICINE

## 2017-04-25 PROCEDURE — 74011000258 HC RX REV CODE- 258: Performed by: PHYSICIAN ASSISTANT

## 2017-04-25 RX ORDER — HEPARIN 100 UNIT/ML
500 SYRINGE INTRAVENOUS ONCE
Status: COMPLETED | OUTPATIENT
Start: 2017-04-25 | End: 2017-04-25

## 2017-04-25 RX ORDER — SODIUM CHLORIDE 0.9 % (FLUSH) 0.9 %
10-40 SYRINGE (ML) INJECTION AS NEEDED
Status: DISCONTINUED | OUTPATIENT
Start: 2017-04-25 | End: 2017-04-29 | Stop reason: HOSPADM

## 2017-04-25 RX ADMIN — HEPARIN 500 UNITS: 100 SYRINGE at 11:59

## 2017-04-25 RX ADMIN — Medication 10 ML: at 11:59

## 2017-04-25 RX ADMIN — CEFTRIAXONE SODIUM 2 G: 2 INJECTION, POWDER, FOR SOLUTION INTRAMUSCULAR; INTRAVENOUS at 11:24

## 2017-04-25 NOTE — PROGRESS NOTES
[facility] Progress Note    Date: 2017    Name: Niru Weiner    MRN: [id number]         : 1960    Rocephin Infusion    Mr. Antonio Pink to [facility], ambulatory at 1110 . Pt was assessed and education was provided. Mr. Carol Ann hills were reviewed. Visit Vitals    /70 (BP 1 Location: Left arm, BP Patient Position: Sitting)    Pulse 85    Temp 97.1 °F (36.2 °C)    Resp 18    SpO2 99%       Right upper arm PICC flushed easily and had brisk blood return via both ports. PICC dressing c/d/i and not due to be changed. No swelling, redness, streaking, warmth or drainage noted in arm. Pt denied c/o pain in arm.      []  Vancomycin     []  Invanz     []  Cubicin     [x]  Rocephin 2 grams    was infused at 100 ml/hr (over approximately 30 minutes). Mr. Antonio Pink tolerated infusion, and had no complaints at this time. Both lumens of PICC flushed with NS 10 ml and Heparin 250 units. Lumens wrapped with guaze and paper tape. Stockinette placed over dressing for protection. Patient armband removed and shredded. Mr. Antonio Pink was discharged from [place] [address] in stable condition at 1205. He is to return on 17 at 1100 for his next antibiotic appointment.     Lesly Hall RN  2017

## 2017-04-26 ENCOUNTER — HOSPITAL ENCOUNTER (OUTPATIENT)
Dept: INFUSION THERAPY | Age: 57
Discharge: HOME | End: 2017-04-26
Payer: MEDICARE

## 2017-04-26 VITALS
HEART RATE: 79 BPM | OXYGEN SATURATION: 98 % | DIASTOLIC BLOOD PRESSURE: 59 MMHG | RESPIRATION RATE: 18 BRPM | SYSTOLIC BLOOD PRESSURE: 98 MMHG | TEMPERATURE: 95.9 F

## 2017-04-26 PROCEDURE — 96365 THER/PROPH/DIAG IV INF INIT: CPT

## 2017-04-26 PROCEDURE — 74011000258 HC RX REV CODE- 258: Performed by: PHYSICIAN ASSISTANT

## 2017-04-26 PROCEDURE — 74011250636 HC RX REV CODE- 250/636: Performed by: PHYSICIAN ASSISTANT

## 2017-04-26 RX ORDER — SODIUM CHLORIDE 0.9 % (FLUSH) 0.9 %
10-40 SYRINGE (ML) INJECTION AS NEEDED
Status: DISCONTINUED | OUTPATIENT
Start: 2017-04-26 | End: 2017-04-30 | Stop reason: HOSPADM

## 2017-04-26 RX ADMIN — Medication 10 ML: at 11:49

## 2017-04-26 RX ADMIN — CEFTRIAXONE SODIUM 2 G: 2 INJECTION, POWDER, FOR SOLUTION INTRAMUSCULAR; INTRAVENOUS at 11:01

## 2017-04-26 NOTE — PROGRESS NOTES
[facility] Progress Note    Date: 2017    Name: Nikolas Henderson    MRN: [id number]         : 1960    Rocephin Infusion    Mr. Dhiraj Arenas to [facility], ambulatory at 1045 . Pt was assessed and education was provided. Mr. Millard Olszewski vitals were reviewed. Visit Vitals    BP 98/59 (BP 1 Location: Left arm, BP Patient Position: Sitting)    Pulse 79    Temp 95.9 °F (35.5 °C)    Resp 18    SpO2 98%     Patient Vitals for the past 12 hrs:   Temp Pulse Resp BP SpO2   17 1247 95.9 °F (35.5 °C) 79 18 98/59 98 %   17 1100 96.9 °F (36.1 °C) 80 18 100/71 98 %       RIght upper arm PICC flushed easily and had brisk blood return via both ports.           []  Vancomycin     []  Invanz     []  Cubicin     [x]  Rocephin 2 grams    was infused at 100 ml/hr (over approximately 30 minutes). This is Mr Millard Olszewski last Rocephin infusion. Order to remove PICC line . PICC line removed by Jose Alejandro Diego RN from radiology using sterile procedure. No bleeding or redness at site. Site covered with gauze and transparent dressing. Mr Dhiraj Arenas remained in supine position for 30 minutes following removal.    VS remained stable. Discharge instructions reviewed and patient verbalized understanding. Mr Dhiraj Arenas given a printed copy to take home. Mr. Dhiraj Arenas tolerated infusion, and had no complaints at this time. Patient armband removed and shredded. Mr. Dhiraj Arenas was discharged from [place] [address] in stable condition at 1300.      Rena Bruce RN  2017

## 2017-04-26 NOTE — DISCHARGE INSTRUCTIONS
[facility]    DISCHARGE INSTRUCTIONS FOR:  REMOVAL OFPICC  LINE    Now that your PICC Line has been removed, please follow the instructions below regarding your site care: You will be kept resting in a supine position for 30--60 minutes immediately after the PICC has been removed. Avoid heavy lifting or excessive use of the extremity for 24 hours. If drainage or bleeding is present, apply direct pressure until it has stopped. If bleeding persists, continue pressure and seek emergency medical care. The area under the dressing must be kept clean and dry for 3 days. Do not submerge the area in water for 3 days. You may shower, but cover the dressing with clear plastic wrap and apply tape around the edges. Remove the plastic wrap after showering. After 3 days, the dressing may be removed. Before removing the dressing, wash hands thoroughly with soap and water. Inspect the site. Gently wash with warm soapy water. Pat dry and re-cover with a band aid until a scab forms. Report to your physician any of the following:    Chills and fever;  Swelling, redness, pain or if site is warm to the touch; Any pus or unusual drainage from the site; Any questions or concerns. Isac Aviles, Signature: ______________________________ 4/26/2017  Howie Fry RN [facility]    DISCHARGE INSTRUCTIONS FOR:  REMOVAL OFPICC  LINE    Now that your PICC Line has been removed, please follow the instructions below regarding your site care: You will be kept resting in a supine position for 30--60 minutes immediately after the PICC has been removed. Avoid heavy lifting or excessive use of the extremity for 24 hours. If drainage or bleeding is present, apply direct pressure until it has stopped. If bleeding persists, continue pressure and seek emergency medical care. The area under the dressing must be kept clean and dry for 3 days.   Do not submerge the area in water for 3 days.  You may shower, but cover the dressing with clear plastic wrap and apply tape around the edges. Remove the plastic wrap after showering. After 3 days, the dressing may be removed. Before removing the dressing, wash hands thoroughly with soap and water. Inspect the site. Gently wash with warm soapy water. Pat dry and re-cover with a band aid until a scab forms. Report to your physician any of the following:    Chills and fever;  Swelling, redness, pain or if site is warm to the touch; Any pus or unusual drainage from the site; Any questions or concerns.     Urmila Garcia, Signature: ______________________________ 4/26/2017  Boyd Mohs, RN